# Patient Record
Sex: FEMALE | Race: WHITE | NOT HISPANIC OR LATINO | Employment: FULL TIME | URBAN - METROPOLITAN AREA
[De-identification: names, ages, dates, MRNs, and addresses within clinical notes are randomized per-mention and may not be internally consistent; named-entity substitution may affect disease eponyms.]

---

## 2017-05-02 ENCOUNTER — OFFICE VISIT (OUTPATIENT)
Dept: INTERNAL MEDICINE | Facility: CLINIC | Age: 42
End: 2017-05-02
Payer: COMMERCIAL

## 2017-05-02 VITALS
HEIGHT: 61 IN | SYSTOLIC BLOOD PRESSURE: 90 MMHG | OXYGEN SATURATION: 100 % | HEART RATE: 82 BPM | WEIGHT: 97 LBS | TEMPERATURE: 98.2 F | BODY MASS INDEX: 18.31 KG/M2 | DIASTOLIC BLOOD PRESSURE: 60 MMHG

## 2017-05-02 DIAGNOSIS — R59.1 LYMPHADENOPATHY: ICD-10-CM

## 2017-05-02 DIAGNOSIS — Z00.00 ROUTINE GENERAL MEDICAL EXAMINATION AT A HEALTH CARE FACILITY: Primary | ICD-10-CM

## 2017-05-02 LAB
ERYTHROCYTE [DISTWIDTH] IN BLOOD BY AUTOMATED COUNT: 13.7 % (ref 10–15)
HCT VFR BLD AUTO: 38.2 % (ref 35–47)
HGB BLD-MCNC: 12.3 G/DL (ref 11.7–15.7)
MCH RBC QN AUTO: 29 PG (ref 26.5–33)
MCHC RBC AUTO-ENTMCNC: 32.2 G/DL (ref 31.5–36.5)
MCV RBC AUTO: 90 FL (ref 78–100)
PLATELET # BLD AUTO: 256 10E9/L (ref 150–450)
RBC # BLD AUTO: 4.24 10E12/L (ref 3.8–5.2)
WBC # BLD AUTO: 4.4 10E9/L (ref 4–11)

## 2017-05-02 PROCEDURE — 80053 COMPREHEN METABOLIC PANEL: CPT | Performed by: INTERNAL MEDICINE

## 2017-05-02 PROCEDURE — 84443 ASSAY THYROID STIM HORMONE: CPT | Performed by: INTERNAL MEDICINE

## 2017-05-02 PROCEDURE — 85027 COMPLETE CBC AUTOMATED: CPT | Performed by: INTERNAL MEDICINE

## 2017-05-02 PROCEDURE — 80061 LIPID PANEL: CPT | Performed by: INTERNAL MEDICINE

## 2017-05-02 PROCEDURE — 99396 PREV VISIT EST AGE 40-64: CPT | Performed by: INTERNAL MEDICINE

## 2017-05-02 PROCEDURE — 36415 COLL VENOUS BLD VENIPUNCTURE: CPT | Performed by: INTERNAL MEDICINE

## 2017-05-02 ASSESSMENT — ANXIETY QUESTIONNAIRES
1. FEELING NERVOUS, ANXIOUS, OR ON EDGE: NOT AT ALL
7. FEELING AFRAID AS IF SOMETHING AWFUL MIGHT HAPPEN: SEVERAL DAYS
5. BEING SO RESTLESS THAT IT IS HARD TO SIT STILL: NOT AT ALL
3. WORRYING TOO MUCH ABOUT DIFFERENT THINGS: SEVERAL DAYS
6. BECOMING EASILY ANNOYED OR IRRITABLE: NOT AT ALL
GAD7 TOTAL SCORE: 3
2. NOT BEING ABLE TO STOP OR CONTROL WORRYING: SEVERAL DAYS
IF YOU CHECKED OFF ANY PROBLEMS ON THIS QUESTIONNAIRE, HOW DIFFICULT HAVE THESE PROBLEMS MADE IT FOR YOU TO DO YOUR WORK, TAKE CARE OF THINGS AT HOME, OR GET ALONG WITH OTHER PEOPLE: NOT DIFFICULT AT ALL

## 2017-05-02 ASSESSMENT — PATIENT HEALTH QUESTIONNAIRE - PHQ9: 5. POOR APPETITE OR OVEREATING: NOT AT ALL

## 2017-05-02 NOTE — PROGRESS NOTES
Dr Storm's note    Patient's instructions / PLAN:                                                        Plan:  1. Labs today  2. Neck CT - To schedule this test you may call Scheduling center at [phone]    3. ENT referral       ASSESSMENT & PLAN:                                                      (Z00.00) Routine general medical examination at a health care facility  (primary encounter diagnosis)  Comment:   Plan: CBC with platelets, Comprehensive metabolic         panel, Lipid panel reflex to direct LDL, TSH         with free T4 reflex            (R59.1) Lymphadenopathy  Comment:   Plan: CT Soft Tissue Neck w Contrast, OTOLARYNGOLOGY         REFERRAL, CBC with platelets, Comprehensive         metabolic panel, Lipid panel reflex to direct         LDL, TSH with free T4 reflex               Chief Complaint:                                                        Annual exam    SUBJECTIVE:                                                    History of present illness     Lump on the L side of the neck x 1 y. Neg US  Exam: sof 1 cm nodule on the L side     ROS:   General: Negative for fever, chills, major weight changes, fatigue  Skin: Negative for rashes, abnormal spots  Eyes: Negative for blurred or double vision  ENT/mouth: Negative for sinuses discomfort, earache, sore throat  Respiratory: Negative for cough, wheezes, chronic lung disease  Cardiovascular: Negative for rest or exertional chest pain, shortness of breath, palpitations, leg edema,   Gastrointestinal: Negative for vomiting, abdominal pain, heartburn, blood in stool, diarrhea, constipation  Genitourinary: Negative for urinary frequency, blood in urine, history of kidney stones  Female: Negative for abnormal vaginal bleeding, vaginal discharge  Neuro: Negative for headaches, numbness, tingling, weakness in arms or legs, history of seizure, recent syncope  Psychiatry: Negative for depression, anxiety, suicidal thoughts  Endo: Negative for known  "thyroid disease, diabetes.  Hemato/Lymph: Negative for nodes, easy bleeding, history of DVT, blood transfusion  Musculoskeletal: Negative for joint swelling, back pain      PMHx: - reviewed  Past Medical History:   Diagnosis Date     CARDIOVASCULAR SCREENING; LDL GOAL LESS THAN 160      Chronic lower back pain      Leukopenia May 2012     NO ACTIVE PROBLEMS      Vertigo 2013     Vitamin D deficiencies        PSHx: reviewed  Past Surgical History:   Procedure Laterality Date     BREAST BIOPSY, RT/LT        SECTION          Soc Hx: No daily alcohol, no smoking  Social History     Social History     Marital status:      Spouse name: N/A     Number of children: N/A     Years of education: N/A     Occupational History     Not on file.     Social History Main Topics     Smoking status: Never Smoker     Smokeless tobacco: Never Used     Alcohol use No     Drug use: No     Sexual activity: No     Other Topics Concern     Not on file     Social History Narrative        Fam Hx: reviewed  Family History   Problem Relation Age of Onset     DIABETES Father      HEART DISEASE Father          Screening: reviewed    All: reviewed    Meds: reviewed  Current Outpatient Prescriptions   Medication Sig Dispense Refill     multivitamin, therapeutic with minerals (MULTI-VITAMIN) TABS Take 1 tablet by mouth daily 100 tablet 3     Cholecalciferol (VITAMIN D) 1000 UNITS capsule Take 1 capsule by mouth daily         OBJECTIVE:                                                    Physical Exam :  Blood pressure 90/60, pulse 82, temperature 98.2  F (36.8  C), temperature source Oral, height 5' 0.5\" (1.537 m), weight 97 lb (44 kg), SpO2 100 %, not currently breastfeeding.     NAD, appears comfortable  Skin clear, no rashes  HEENT: PERRLA, EOMI, anicteric sclera, pink conjunctiva, external ears appear normal, bilateral tympanic membranes clinically normal, oropharynx normal color.  Neck: supple, no JVD,  no thyroidmegaly  Lymph " nodes non palpable in the cervical, supraclavicular axillaries, inguinal areas  Chest: clear to auscultation with good respiratory effort  Cardiac: S1S2, RRR, no mgr appreciated  Abdomen: soft, not tender, not distended, audible bowel sound, no hepatosplenomegaly, no palpable masses, no abdominal bruits  Extremities: no cyanosis, clubbing or edema.   Neuro: A, Ox3, no focal signs.  Breast exam in supine and erect position: they are symmetrical, no skin changes, no tenderness or nodes on palpation. Nipples are erect, no skin lesions, no discharge on pressure.    Pelvic exam: deferred, no symptoms, no hx of abnormal pap         Jacquie Storm MD  Internal Medicine          SUBJECTIVE:     CC: Lubna Corcoran is an 42 year old woman who presents for preventive health visit.     Healthy Habits:    Do you get at least three servings of calcium containing foods daily (dairy, green leafy vegetables, etc.)? yes    Amount of exercise or daily activities, outside of work: 7 day(s) per week    Problems taking medications regularly No    Medication side effects: No    Have you had an eye exam in the past two years? yes    Do you see a dentist twice per year? yes    Do you have sleep apnea, excessive snoring or daytime drowsiness?no        Today's PHQ-2 Score:   PHQ-2 ( 1999 [employer]) 5/2/2017 4/11/2016   Q1: Little interest or pleasure in doing things 0 0   Q2: Feeling down, depressed or hopeless 0 0   PHQ-2 Score 0 0       Abuse: Current or Past(Physical, Sexual or Emotional)- No  Do you feel safe in your environment - Yes    Social History   Substance Use Topics     Smoking status: Never Smoker     Smokeless tobacco: Never Used     Alcohol use No     The patient does not drink >3 drinks per day nor >7 drinks per week.    Recent Labs   Lab Test  04/07/15   1328  01/23/14   0922   CHOL  150  160   HDL  75  64   LDL  67  86   TRIG  40  53   CHOLHDLRATIO  2.0  2.5       Reviewed orders with patient.  Reviewed health maintenance and  "updated orders accordingly - Yes    Mammo Decision Support:    COUNSELING:   Reviewed preventive health counseling, as reflected in patient instructions       Regular exercise       Healthy diet/nutrition         reports that she has never smoked. She has never used smokeless tobacco.    Estimated body mass index is 18.48 kg/(m^2) as calculated from the following:    Height as of 12/2/16: 5' 0.5\" (1.537 m).    Weight as of 12/2/16: 96 lb 3.2 oz (43.6 kg).       Counseling Resources:  ATP IV Guidelines  Pooled Cohorts Equation Calculator  Breast Cancer Risk Calculator  FRAX Risk Assessment  ICSI Preventive Guidelines  Dietary Guidelines for Americans, 2010  USDA's MyPlate  ASA Prophylaxis  Lung CA Screening    Jacquie Georges MD  [facility]  "

## 2017-05-02 NOTE — MR AVS SNAPSHOT
After Visit Summary   5/2/2017    Lubna Corcoran    MRN: [id number]           Patient Information     Date Of Birth          1975        Visit Information        Provider Department      5/2/2017 8:20 AM Jacquie Georges MD [facility]        Today's Diagnoses     Routine general medical examination at a health care facility    -  1    Lymphadenopathy          Care Instructions    Plan:  1. Labs today  2. Neck CT - To schedule this test you may call Scheduling center at [phone]    3. ENT referral       Preventive Health Recommendations  Female Ages 40 to 49    Yearly exam:     See your health care provider every year in order to  1. Review health changes.   2. Discuss preventive care.    3. Review your medicines if your doctor prescribed any.      Get a Pap test every three years (unless you have an abnormal result and your provider advises testing more often).      If you get Pap tests with HPV test, you only need to test every 5 years, unless you have an abnormal result. You do not need a Pap test if your uterus was removed (hysterectomy) and you have not had cancer.      You should be tested each year for STDs (sexually transmitted diseases), if you're at risk.       Ask your doctor if you should have a mammogram.      Have a colonoscopy (test for colon cancer) if someone in your family has had colon cancer or polyps before age 50.       Have a cholesterol test every 5 years.       Have a diabetes test (fasting glucose) after age 45. If you are at risk for diabetes, you should have this test every 3 years.    Shots: Get a flu shot each year. Get a tetanus shot every 10 years.     Nutrition:     Eat at least 5 servings of fruits and vegetables each day.    Eat whole-grain bread, whole-wheat pasta and brown rice instead of white grains and rice.    Talk to your provider about Calcium and Vitamin D.     Lifestyle    Exercise at least 150 minutes a week (an average  "of 30 minutes a day, 5 days a week). This will help you control your weight and prevent disease.    Limit alcohol to one drink per day.    No smoking.     Wear sunscreen to prevent skin cancer.    See your dentist every six months for an exam and cleaning.        HEALTH INSURANCE AND YOUR OUT-OF-POCKET COSTS    How is the physical visit different from an office visit ?    A physical visit is a routine check-up or yearly physical exam. This is sometimes called \"preventive care\".  ( for example, you might have a clinic exam every year or a mammogram every other year). These visits are meant to prevent health problems. They do not include tests or treatments for specific medical issues.     An office visit is a clinic visit to check on a symptoms or to treat a specific concern. This concern may be new or ongoing. Your provider (care team) might order tests or prescribe treatments.     Can I have these services at the same time ?    Yes. If you come in for a physical exam, your provider will want to  talk about any symptoms you are having. This way, we may catch small problems before they become more serious - and you won't have to make another trip to the clinic.     If there is not enough time to talk about your symptoms, your provider will ask you to come back.    If you are treated for a medical issue during a physical exam, we must bill your plan for both services. This is a rule set by insurance companies.     If I receive both services, what are my out-of-pocket costs ?    Some plans will pay for both services. Others ( like Medicare) will not. You will need to pay for any service that your plan won't cover.     Even if your plan covers both services, you may still have out-of-pocket costs. Examples:    -- your plan may offer free physical exams. But you may owe a co-pay and other fees for services received as part of an office visit.   -- you plan may require two co-pays. If you have concerns about the second " "co-pay, please contact your insurance plan.    To find out what your total costs will be, you will need to call your insurance plan. Ask:    -- what does my plan cover ? Find out if your physical visit was covered. What if you also had testing or treatment for a medical concern ?    -- how much do I need to pay ? Ask about co-pays, co-insurance, your deductible and any other out-of-pocket costs.     -- are there limits on what my plan will pay for ? There may be limits on office visits, physical exams and routine tests ( such mammograms, PSA tests and colonoscopies).    About Your Out-Of-Pocket Costs    Out-of-Pocket costs are charges that are not covered by your insurance plan. You will need to pay for them yourself. They may include:    -- Services that your plan will not pay for. Please call your insurance plan to find out what it will cover.     -- A deductible. This is a fixed amount that you pay each year before insurance will pay for services. When you have paid the full amount, then you have \"met\" your deductible. After that, your plan will pay for part or all of your care.     -- Co-pays (co-payments). A co-pay is the amount you must pay at the time of service. It is a flat fee, decided by your insurance plan. Your fee may be different for wellness visits and office visits. Your plan will not cover this fee. The fee will not count toward your deductible.    -- Co-insurance. You may need to pay a percent of the costs for all services you receive. This is called co-insurance. After your clinic visit, your plan will bill you for your share of the cost. This amount may count toward your deductible.     Copyright @ [employer]. All rights reserved. [employer] 04478 - REV 11/12  -------------    Be aware that if you had a regular OBGYN appointment in the last 12 months that it might have been submitted to your insurance as the annual physical exam. Most of the insurances do not cover 2 annual exams " in a year.              Follow-ups after your visit        Additional Services     OTOLARYNGOLOGY REFERRAL       Your provider has referred you to:     N: [facility] [place] [phone]   [email or web address]    FHN: [facility] [phone]   [email or web address]  FHN: [facility] [phone]   [email or web address]  N: [facility] [place] [phone]   [email or web address]  N: [facility] [place] [phone]   [email or web address]  FHN: [facility] [phone]   [email or web address]  N: [facility] [phone]   [email or web address]    Please be aware that coverage of these services is subject to the terms and limitations of your health insurance plan.  Call member services at your health plan with any benefit or coverage questions.      Please bring the following to your appointment:  >>   Any x-rays, CTs or MRIs which have been performed.  Contact the facility where they were done to arrange for  prior to your scheduled appointment.  Any new CT, MRI or other procedures ordered by your specialist must be performed at a [facility] or coordinated by your clinic's referral office.    >>   List of current medications   >>   This referral request   >>   Any documents/labs given to you for this referral                  Future tests that were ordered for you today     Open Future Orders        Priority Expected Expires Ordered    CT Soft Tissue Neck w Contrast Routine  5/2/2018 5/2/2017            Who to contact     If you have questions or need follow up information about today's clinic visit or your schedule please contact [facility] directly  "at [phone].  Normal or non-critical lab and imaging results will be communicated to you by [employer], letter or phone within 4 business days after the clinic has received the results. If you do not hear from us within 7 days, please contact the clinic through [employer] or phone. If you have a critical or abnormal lab result, we will notify you by phone as soon as possible.  Submit refill requests through [employer] or call your pharmacy and they will forward the refill request to us. Please allow 3 business days for your refill to be completed.          Additional Information About Your Visit        [employer] Information     [employer] lets you send messages to your doctor, view your test results, renew your prescriptions, schedule appointments and more. To sign up, go to [email or web address] . Click on \"Log in\" on the left side of the screen, which will take you to the Welcome page. Then click on \"Sign up Now\" on the right side of the page.     You will be asked to enter the access code listed below, as well as some personal information. Please follow the directions to create your username and password.     Your access code is: [email or web address]  Expires: 2017  7:40 PM     Your access code will  in 90 days. If you need help or a new code, please call your [facility] clinic or [phone].        Care EveryWhere ID     This is your Care EveryWhere ID. This could be used by other organizations to access your [facility] medical records  [id number]        Your Vitals Were     Pulse Temperature Height Pulse Oximetry Breastfeeding? BMI (Body Mass Index)    82 98.2  F (36.8  C) (Oral) 5' 0.5\" (1.537 m) 100% No 18.63 kg/m2       Blood Pressure from Last 3 Encounters:   17 90/60   16 94/66   16 96/68    Weight from Last 3 Encounters:   17 97 lb (44 kg)   16 96 lb 3.2 oz (43.6 kg)   16 97 lb 11.2 oz (44.3 kg)              We Performed the Following     CBC with platelets     Comprehensive " metabolic panel     Lipid panel reflex to direct LDL     OTOLARYNGOLOGY REFERRAL     TSH with free T4 reflex        Primary Care Provider Office Phone # Fax #    Jacquie Georges [phone]       [facility] [address] [facility] [address]        Thank you!     Thank you for choosing [facility]  for your care. Our goal is always to provide you with excellent care. Hearing back from our patients is one way we can continue to improve our services. Please take a few minutes to complete the written survey that you may receive in the mail after your visit with us. Thank you!             Your Updated Medication List - Protect others around you: Learn how to safely use, store and throw away your medicines at www.disposemymeds.org.          This list is accurate as of: 5/2/17  9:07 AM.  Always use your most recent med list.                   Brand Name Dispense Instructions for use    Multi-vitamin Tabs tablet     100 tablet    Take 1 tablet by mouth daily       vitamin D 1000 UNITS capsule      Take 1 capsule by mouth daily

## 2017-05-02 NOTE — NURSING NOTE
"Chief Complaint   Patient presents with     Physical     Fasting       Initial BP 90/60 (BP Location: Right arm, Patient Position: Chair, Cuff Size: Adult Regular)  Pulse 82  Temp 98.2  F (36.8  C) (Oral)  Ht 5' 0.5\" (1.537 m)  Wt 97 lb (44 kg)  SpO2 100%  Breastfeeding? No  BMI 18.63 kg/m2 Estimated body mass index is 18.63 kg/(m^2) as calculated from the following:    Height as of this encounter: 5' 0.5\" (1.537 m).    Weight as of this encounter: 97 lb (44 kg).  Medication Reconciliation: complete   Jeanne Kaur CMA      "

## 2017-05-02 NOTE — LETTER
[facility]  [address], [address]  [place] [address]  [phone]        May 23, 2017    Lubna Corcoran  [address] [place] [address]            Dear MsShane Lubna Nilo:    The recent blood tests results are in acceptable limits.    Sincerely,    Jacquie Storm MD  Internal Medicine    These are some general explanations for tests  WBC means White Blood Cells  Platelets are small blood cells that help with forming the blood clots along with other blood factors.  Electrolytes are Sodium, Potassium, Calcium, Magnesium, Phosphorus.  Liver tests are: AST, ALT, Bilirubin, Alkaline Phosphatase.  Kidney tests are Creatinine, GFR.  HDL Cholesterol - is the good cholesterol and it is good to have it high.  LDL cholesterol is the bad cholesterol and it is good to have it low.  It is recommended to have LDL less than 130 for people with hypertension and to have it less than 100 for people with heart disease, diabetes and chronic kidney disease.  Thyroid tests are TSH, T4, T3  A1c is a test that gives us an idea about how well was controlled the diabetes for the last 3 months.

## 2017-05-02 NOTE — PATIENT INSTRUCTIONS
Plan:  1. Labs today  2. Neck CT - To schedule this test you may call Scheduling center at [phone]    3. ENT referral       Preventive Health Recommendations  Female Ages 40 to 49    Yearly exam:     See your health care provider every year in order to  1. Review health changes.   2. Discuss preventive care.    3. Review your medicines if your doctor prescribed any.      Get a Pap test every three years (unless you have an abnormal result and your provider advises testing more often).      If you get Pap tests with HPV test, you only need to test every 5 years, unless you have an abnormal result. You do not need a Pap test if your uterus was removed (hysterectomy) and you have not had cancer.      You should be tested each year for STDs (sexually transmitted diseases), if you're at risk.       Ask your doctor if you should have a mammogram.      Have a colonoscopy (test for colon cancer) if someone in your family has had colon cancer or polyps before age 50.       Have a cholesterol test every 5 years.       Have a diabetes test (fasting glucose) after age 45. If you are at risk for diabetes, you should have this test every 3 years.    Shots: Get a flu shot each year. Get a tetanus shot every 10 years.     Nutrition:     Eat at least 5 servings of fruits and vegetables each day.    Eat whole-grain bread, whole-wheat pasta and brown rice instead of white grains and rice.    Talk to your provider about Calcium and Vitamin D.     Lifestyle    Exercise at least 150 minutes a week (an average of 30 minutes a day, 5 days a week). This will help you control your weight and prevent disease.    Limit alcohol to one drink per day.    No smoking.     Wear sunscreen to prevent skin cancer.    See your dentist every six months for an exam and cleaning.        HEALTH INSURANCE AND YOUR OUT-OF-POCKET COSTS    How is the physical visit different from an office visit ?    A physical visit is a routine check-up or yearly  "physical exam. This is sometimes called \"preventive care\".  ( for example, you might have a clinic exam every year or a mammogram every other year). These visits are meant to prevent health problems. They do not include tests or treatments for specific medical issues.     An office visit is a clinic visit to check on a symptoms or to treat a specific concern. This concern may be new or ongoing. Your provider (care team) might order tests or prescribe treatments.     Can I have these services at the same time ?    Yes. If you come in for a physical exam, your provider will want to  talk about any symptoms you are having. This way, we may catch small problems before they become more serious - and you won't have to make another trip to the clinic.     If there is not enough time to talk about your symptoms, your provider will ask you to come back.    If you are treated for a medical issue during a physical exam, we must bill your plan for both services. This is a rule set by insurance companies.     If I receive both services, what are my out-of-pocket costs ?    Some plans will pay for both services. Others ( like Medicare) will not. You will need to pay for any service that your plan won't cover.     Even if your plan covers both services, you may still have out-of-pocket costs. Examples:    -- your plan may offer free physical exams. But you may owe a co-pay and other fees for services received as part of an office visit.   -- you plan may require two co-pays. If you have concerns about the second co-pay, please contact your insurance plan.    To find out what your total costs will be, you will need to call your insurance plan. Ask:    -- what does my plan cover ? Find out if your physical visit was covered. What if you also had testing or treatment for a medical concern ?    -- how much do I need to pay ? Ask about co-pays, co-insurance, your deductible and any other out-of-pocket costs.     -- are there limits on " "what my plan will pay for ? There may be limits on office visits, physical exams and routine tests ( such mammograms, PSA tests and colonoscopies).    About Your Out-Of-Pocket Costs    Out-of-Pocket costs are charges that are not covered by your insurance plan. You will need to pay for them yourself. They may include:    -- Services that your plan will not pay for. Please call your insurance plan to find out what it will cover.     -- A deductible. This is a fixed amount that you pay each year before insurance will pay for services. When you have paid the full amount, then you have \"met\" your deductible. After that, your plan will pay for part or all of your care.     -- Co-pays (co-payments). A co-pay is the amount you must pay at the time of service. It is a flat fee, decided by your insurance plan. Your fee may be different for wellness visits and office visits. Your plan will not cover this fee. The fee will not count toward your deductible.    -- Co-insurance. You may need to pay a percent of the costs for all services you receive. This is called co-insurance. After your clinic visit, your plan will bill you for your share of the cost. This amount may count toward your deductible.     Copyright @ [facility]. All rights reserved. [employer] 11899 - REV 11/12  -------------    Be aware that if you had a regular OBGYN appointment in the last 12 months that it might have been submitted to your insurance as the annual physical exam. Most of the insurances do not cover 2 annual exams in a year.        "

## 2017-05-03 LAB
ALBUMIN SERPL-MCNC: 3.8 G/DL (ref 3.4–5)
ALP SERPL-CCNC: 46 U/L (ref 40–150)
ALT SERPL W P-5'-P-CCNC: 23 U/L (ref 0–50)
ANION GAP SERPL CALCULATED.3IONS-SCNC: 10 MMOL/L (ref 3–14)
AST SERPL W P-5'-P-CCNC: 15 U/L (ref 0–45)
BILIRUB SERPL-MCNC: 0.5 MG/DL (ref 0.2–1.3)
BUN SERPL-MCNC: 11 MG/DL (ref 7–30)
CALCIUM SERPL-MCNC: 8.7 MG/DL (ref 8.5–10.1)
CHLORIDE SERPL-SCNC: 109 MMOL/L (ref 94–109)
CHOLEST SERPL-MCNC: 183 MG/DL
CO2 SERPL-SCNC: 21 MMOL/L (ref 20–32)
CREAT SERPL-MCNC: 0.57 MG/DL (ref 0.52–1.04)
GFR SERPL CREATININE-BSD FRML MDRD: >90 ML/MIN/1.7M2
GLUCOSE SERPL-MCNC: 84 MG/DL (ref 70–99)
HDLC SERPL-MCNC: 80 MG/DL
LDLC SERPL CALC-MCNC: 96 MG/DL
NONHDLC SERPL-MCNC: 103 MG/DL
POTASSIUM SERPL-SCNC: 4.4 MMOL/L (ref 3.4–5.3)
PROT SERPL-MCNC: 7.3 G/DL (ref 6.8–8.8)
SODIUM SERPL-SCNC: 140 MMOL/L (ref 133–144)
TRIGL SERPL-MCNC: 37 MG/DL
TSH SERPL DL<=0.005 MIU/L-ACNC: 2.21 MU/L (ref 0.4–4)

## 2017-05-03 ASSESSMENT — PATIENT HEALTH QUESTIONNAIRE - PHQ9: SUM OF ALL RESPONSES TO PHQ QUESTIONS 1-9: 7

## 2017-05-03 ASSESSMENT — ANXIETY QUESTIONNAIRES: GAD7 TOTAL SCORE: 3

## 2017-05-22 ENCOUNTER — TELEPHONE (OUTPATIENT)
Dept: INTERNAL MEDICINE | Facility: CLINIC | Age: 42
End: 2017-05-22

## 2017-05-22 NOTE — TELEPHONE ENCOUNTER
Reason for call: Results   Name of test or procedure: LAB  Date of test or procedure: 5/2/17  Location of test or procedure: LAB[facility] [address] [place] [address]    Additional comments: Pt wants the results of her lab work done after appt on 5/2/17    Phone Number Pt can be reached at: Other phone number:  [phone]  Best Time: 1:30pm  Can we leave a detailed message on this number? YES

## 2018-06-09 ENCOUNTER — HEALTH MAINTENANCE LETTER (OUTPATIENT)
Age: 43
End: 2018-06-09

## 2018-07-18 ENCOUNTER — TELEPHONE (OUTPATIENT)
Dept: INTERNAL MEDICINE | Facility: CLINIC | Age: 43
End: 2018-07-18

## 2018-07-19 NOTE — TELEPHONE ENCOUNTER
7/18/2018    Call Regarding Preventive Health Screening Cervical/PAP   and PHYSICAL    Attempt 1    Message on voicemail     Comments:       Outreach   SV

## 2018-07-24 NOTE — TELEPHONE ENCOUNTER
7/24/2018    Call Regarding Preventive Health Screening Cervical/PAP and Physical     Attempt 2    Message on voicemail    Comments:       Outreach   Robyn Boone

## 2018-10-05 ENCOUNTER — OFFICE VISIT (OUTPATIENT)
Dept: INTERNAL MEDICINE | Facility: CLINIC | Age: 43
End: 2018-10-05
Payer: COMMERCIAL

## 2018-10-05 VITALS
WEIGHT: 100 LBS | OXYGEN SATURATION: 98 % | HEART RATE: 75 BPM | HEIGHT: 61 IN | BODY MASS INDEX: 18.88 KG/M2 | TEMPERATURE: 98.3 F | DIASTOLIC BLOOD PRESSURE: 66 MMHG | RESPIRATION RATE: 12 BRPM | SYSTOLIC BLOOD PRESSURE: 104 MMHG

## 2018-10-05 DIAGNOSIS — Z12.4 SCREENING FOR MALIGNANT NEOPLASM OF CERVIX: ICD-10-CM

## 2018-10-05 DIAGNOSIS — M54.50 INTERMITTENT LOW BACK PAIN: ICD-10-CM

## 2018-10-05 DIAGNOSIS — Z12.31 VISIT FOR SCREENING MAMMOGRAM: ICD-10-CM

## 2018-10-05 DIAGNOSIS — Z02.9 ADMINISTRATIVE ENCOUNTER: ICD-10-CM

## 2018-10-05 DIAGNOSIS — Z00.00 ROUTINE GENERAL MEDICAL EXAMINATION AT A HEALTH CARE FACILITY: Primary | ICD-10-CM

## 2018-10-05 PROCEDURE — 99396 PREV VISIT EST AGE 40-64: CPT | Performed by: INTERNAL MEDICINE

## 2018-10-05 PROCEDURE — 87624 HPV HI-RISK TYP POOLED RSLT: CPT | Performed by: INTERNAL MEDICINE

## 2018-10-05 PROCEDURE — G0145 SCR C/V CYTO,THINLAYER,RESCR: HCPCS | Performed by: INTERNAL MEDICINE

## 2018-10-05 ASSESSMENT — ANXIETY QUESTIONNAIRES
1. FEELING NERVOUS, ANXIOUS, OR ON EDGE: NOT AT ALL
6. BECOMING EASILY ANNOYED OR IRRITABLE: NOT AT ALL
7. FEELING AFRAID AS IF SOMETHING AWFUL MIGHT HAPPEN: NEARLY EVERY DAY
5. BEING SO RESTLESS THAT IT IS HARD TO SIT STILL: NOT AT ALL
GAD7 TOTAL SCORE: 4
GAD7 TOTAL SCORE: 4
4. TROUBLE RELAXING: NOT AT ALL
7. FEELING AFRAID AS IF SOMETHING AWFUL MIGHT HAPPEN: NEARLY EVERY DAY
GAD7 TOTAL SCORE: 4
2. NOT BEING ABLE TO STOP OR CONTROL WORRYING: NOT AT ALL
3. WORRYING TOO MUCH ABOUT DIFFERENT THINGS: SEVERAL DAYS

## 2018-10-05 ASSESSMENT — PATIENT HEALTH QUESTIONNAIRE - PHQ9
SUM OF ALL RESPONSES TO PHQ QUESTIONS 1-9: 4
SUM OF ALL RESPONSES TO PHQ QUESTIONS 1-9: 4

## 2018-10-05 NOTE — PROGRESS NOTES
Dr Storm's note    Patient's instructions / PLAN:                                                        Plan:  1. Mammogram ( please call [phone] to schedule it)   2.  3.  4.  5.  6.  Continue same meds, same doses for now  Continue the other meds, same doses for now.  Please make a lab appointment for fasting labs in   Follow up a week after the labs      ASSESSMENT & PLAN:                                                      (Z00.00) Routine general medical examination at a health care facility  (primary encounter diagnosis)  Comment:   Plan:     (M54.5) Intermittent low back pain  (Z02.9) Administrative encounter  Comment:   Plan: FMLA    (Z12.4) Screening for malignant neoplasm of cervix  Comment:   Plan: Pap imaged thin layer screen with HPV -         recommended age 30 - 65 years (select HPV order        below), HPV High Risk Types DNA Cervical            (Z12.31) Visit for screening mammogram  Comment:   Plan: *MA Screening Digital Bilateral               Chief Complaint:                                                        Annual exam    SUBJECTIVE:                                                    History of present illness     Labs 2017 - normal We will not repeat that this year  She has chronic lower back pain on and off.  She wants a renewal of her FMLA form.      ROS:   General: Negative for fever, chills, major weight changes, fatigue  Skin: Negative for rashes, abnormal spots  Eyes: Negative for blurred or double vision  ENT/mouth: Negative for sinuses discomfort, earache, sore throat  Respiratory: Negative for cough, wheezes, chronic lung disease  Cardiovascular: Negative for rest or exertional chest pain, shortness of breath, palpitations, leg edema,   Gastrointestinal: Negative for vomiting, abdominal pain, heartburn, blood in stool, diarrhea, constipation  Genitourinary: Negative for urinary frequency, blood in urine, history of kidney stones  Female: Negative for abnormal vaginal  "bleeding, vaginal discharge  Neuro: Negative for headaches, numbness, tingling, weakness in arms or legs, history of seizure, recent syncope  Psychiatry: Negative for depression, anxiety, suicidal thoughts  Endo: Negative for known thyroid disease, diabetes.  Hemato/Lymph: Negative for nodes, easy bleeding, history of DVT, blood transfusion  Musculoskeletal: Negative for joint swelling, back pain      PMHx: - reviewed  Past Medical History:   Diagnosis Date     CARDIOVASCULAR SCREENING; LDL GOAL LESS THAN 160      Chronic lower back pain      Leukopenia May 2012     NO ACTIVE PROBLEMS      Vertigo 2013     Vitamin D deficiencies        PSHx: reviewed  Past Surgical History:   Procedure Laterality Date     BREAST BIOPSY, RT/LT        SECTION          Soc Hx: No daily alcohol, no smoking  Social History     Social History     Marital status:      Spouse name: N/A     Number of children: N/A     Years of education: N/A     Occupational History     Not on file.     Social History Main Topics     Smoking status: Never Smoker     Smokeless tobacco: Never Used     Alcohol use No     Drug use: No     Sexual activity: No     Other Topics Concern     Not on file     Social History Narrative        Fam Hx: reviewed  Family History   Problem Relation Age of Onset     Diabetes Father      HEART DISEASE Father      No Known Problems Mother          Screening: reviewed      All: reviewed    Meds: reviewed  Current Outpatient Prescriptions   Medication Sig Dispense Refill     Cholecalciferol (VITAMIN D) 1000 UNITS capsule Take 1 capsule by mouth daily         OBJECTIVE:                                                    Physical Exam :  Blood pressure 104/66, pulse 75, temperature 98.3  F (36.8  C), temperature source Oral, resp. rate 12, height 5' 0.5\" (1.537 m), weight 100 lb (45.4 kg), last menstrual period 2018, SpO2 98 %, not currently breastfeeding.     NAD, appears comfortable  Skin clear, no " rashes  HEENT: PERRLA, EOMI, anicteric sclera, pink conjunctiva, external ears appear normal, bilateral tympanic membranes clinically normal, oropharynx normal color.  Neck: supple, no JVD,  no thyroidmegaly  Lymph nodes non palpable in the cervical, supraclavicular axillaries, inguinal areas  Chest: clear to auscultation with good respiratory effort  Cardiac: S1S2, RRR, no mgr appreciated  Abdomen: soft, not tender, not distended, audible bowel sound, no hepatosplenomegaly, no palpable masses, no abdominal bruits  Extremities: no cyanosis, clubbing or edema.   Neuro: A, Ox3, no focal signs.  Breast exam in supine and erect position: they are symmetrical, no skin changes, no tenderness or nodes on palpation. Nipples are erect, no skin lesions, no discharge on pressure.    Pelvic exam: Normal external genitals, normal appearing perineum, normal appearing urethra,  vaginal mucosa pink, no discharge, Cervix appears normal, Pap smear obtained. On bimanual exam, I did not feel any uterus or ovarian masses, and she denies any tenderness.         Jacquie Storm MD  Internal Medicine        SUBJECTIVE:   CC: Lubna Corcoran is an 43 year old woman who presents for preventive health visit.     Physical   Annual:     Getting at least 3 servings of Calcium per day:  Yes    Bi-annual eye exam:  Yes    Dental care twice a year:  Yes    Sleep apnea or symptoms of sleep apnea:  None    Diet:  Regular (no restrictions)    Frequency of exercise:  2-3 days/week    Duration of exercise:  15-30 minutes    Taking medications regularly:  Yes    Medication side effects:  None    Additional concerns today:  No        Today's PHQ-2 Score:   PHQ-2 ( 1999 [employer]) 10/5/2018   Q1: Little interest or pleasure in doing things 0   Q2: Feeling down, depressed or hopeless 0   PHQ-2 Score 0   Q1: Little interest or pleasure in doing things Not at all   Q2: Feeling down, depressed or hopeless Not at all   PHQ-2 Score 0       Abuse: Current or  "Past(Physical, Sexual or Emotional)- No  Do you feel safe in your environment - Yes    Social History   Substance Use Topics     Smoking status: Never Smoker     Smokeless tobacco: Never Used     Alcohol use No     Alcohol Use 10/5/2018   If you drink alcohol do you typically have greater than 3 drinks per day OR greater than 7 drinks per week? Not Applicable       Reviewed orders with patient.  Reviewed health maintenance and updated orders accordingly - Yes      PAP / HPV Latest Ref Rng & Units 4/7/2015   PAP - NIL   HPV 16 DNA NEG Negative   HPV 18 DNA NEG Negative   OTHER HR HPV NEG Negative     Reviewed and updated as needed this visit by clinical staff  Tobacco  Med Hx  Surg Hx  Fam Hx  Soc Hx        Reviewed and updated as needed this visit by Provider            Review of Systems       OBJECTIVE:   There were no vitals taken for this visit.  Physical Exam        COUNSELING:  Reviewed preventive health counseling, as reflected in patient instructions       Regular exercise       Healthy diet/nutrition    BP Readings from Last 1 Encounters:   05/02/17 90/60     Estimated body mass index is 18.63 kg/(m^2) as calculated from the following:    Height as of 5/2/17: 5' 0.5\" (1.537 m).    Weight as of 5/2/17: 97 lb (44 kg).           reports that she has never smoked. She has never used smokeless tobacco.      Counseling Resources:  ATP IV Guidelines  Pooled Cohorts Equation Calculator  Breast Cancer Risk Calculator  FRAX Risk Assessment  ICSI Preventive Guidelines  Dietary Guidelines for Americans, 2010  USDA's MyPlate  ASA Prophylaxis  Lung CA Screening    Jacquie Georges MD  [facility]  "

## 2018-10-05 NOTE — MR AVS SNAPSHOT
After Visit Summary   10/5/2018    Lubna Corcoran    MRN: [id number]           Patient Information     Date Of Birth          1975        Visit Information        Provider Department      10/5/2018 9:00 AM Jacquie Georges MD [facility]        Today's Diagnoses     Routine general medical examination at a health care facility    -  1    Intermittent low back pain        Administrative encounter        Screening for malignant neoplasm of cervix        Visit for screening mammogram           Follow-ups after your visit        Who to contact     If you have questions or need follow up information about today's clinic visit or your schedule please contact [facility] directly at [phone].  Normal or non-critical lab and imaging results will be communicated to you by MyChart, letter or phone within 4 business days after the clinic has received the results. If you do not hear from us within 7 days, please contact the clinic through StudyCloudhart or phone. If you have a critical or abnormal lab result, we will notify you by phone as soon as possible.  Submit refill requests through [employer]Ink or call your pharmacy and they will forward the refill request to us. Please allow 3 business days for your refill to be completed.          Additional Information About Your Visit        MyChart Information     [employer] gives you secure access to your electronic health record. If you see a primary care provider, you can also send messages to your care team and make appointments. If you have questions, please call your primary care clinic.  If you do not have a primary care provider, please call [phone] and they will assist you.        Care EveryWhere ID     This is your Care EveryWhere ID. This could be used by other organizations to access your [facility] medical records  [id number]        Your Vitals Were     Pulse Temperature Respirations Height Last Period Pulse  "Oximetry    75 98.3  F (36.8  C) (Oral) 12 5' 0.5\" (1.537 m) 09/26/2018 (Exact Date) 98%    Breastfeeding? BMI (Body Mass Index)                No 19.21 kg/m2           Blood Pressure from Last 3 Encounters:   10/05/18 104/66   05/02/17 90/60   12/02/16 94/66    Weight from Last 3 Encounters:   10/05/18 100 lb (45.4 kg)   05/02/17 97 lb (44 kg)   12/02/16 96 lb 3.2 oz (43.6 kg)              We Performed the Following     HPV High Risk Types DNA Cervical     Pap imaged thin layer screen with HPV - recommended age 30 - 65 years (select HPV order below)        Primary Care Provider Office Phone # Fax #    Jacquie Georges [phone]       [address] [facility] [address]        Equal Access to Services     KELLIE MACHUCA : Hadii aad ku hadasho Soroxy, waaxda luqadaha, qaybta kaalmada adeegyada, tavon fontenot . So [facility] [phone].    ATENCIÓN: Si habla español, tiene a pina disposición servicios gratuitos de asistencia lingüística. Llame al [phone].    We comply with applicable federal civil rights laws and [place] laws. We do not discriminate on the basis of race, color, national origin, age, disability, sex, sexual orientation, or gender identity.            Thank you!     Thank you for choosing [facility]  for your care. Our goal is always to provide you with excellent care. Hearing back from our patients is one way we can continue to improve our services. Please take a few minutes to complete the written survey that you may receive in the mail after your visit with us. Thank you!             Your Updated Medication List - Protect others around you: Learn how to safely use, store and throw away your medicines at www.disposemymeds.org.          This list is accurate as of 10/5/18 11:59 PM.  Always use your most recent med list.                   Brand Name Dispense Instructions for use Diagnosis    vitamin D 1000 units capsule "      Take 1 capsule by mouth daily

## 2018-10-06 ASSESSMENT — PATIENT HEALTH QUESTIONNAIRE - PHQ9: SUM OF ALL RESPONSES TO PHQ QUESTIONS 1-9: 4

## 2018-10-06 ASSESSMENT — ANXIETY QUESTIONNAIRES: GAD7 TOTAL SCORE: 4

## 2018-10-09 LAB
COPATH REPORT: NORMAL
PAP: NORMAL

## 2018-10-12 LAB
FINAL DIAGNOSIS: NORMAL
HPV HR 12 DNA CVX QL NAA+PROBE: NEGATIVE
HPV16 DNA SPEC QL NAA+PROBE: NEGATIVE
HPV18 DNA SPEC QL NAA+PROBE: NEGATIVE
SPECIMEN DESCRIPTION: NORMAL
SPECIMEN SOURCE CVX/VAG CYTO: NORMAL

## 2020-02-08 ENCOUNTER — HEALTH MAINTENANCE LETTER (OUTPATIENT)
Age: 45
End: 2020-02-08

## 2020-04-13 ENCOUNTER — TELEPHONE (OUTPATIENT)
Dept: INTERNAL MEDICINE | Facility: CLINIC | Age: 45
End: 2020-04-13

## 2020-04-13 NOTE — TELEPHONE ENCOUNTER
Will route to provider for review.     Given last office visit (2018), should patient have a telephone visit?

## 2020-04-13 NOTE — TELEPHONE ENCOUNTER
Patient calling   Last ov 10/5/2018  Wants note to excuse her from work for fear of COVID 19  Patient did not work last week and stated she will not work this week  Patient stated she has no underlying health issues she is just afraid    Please advise  Ok to call and soledad [phone]

## 2020-04-13 NOTE — TELEPHONE ENCOUNTER
We cannot do a letter indicating that she needs to be off work just because she is afraid of this virus.  We could only give her excuse from work if she is medically ill.

## 2020-09-25 ENCOUNTER — MYC MEDICAL ADVICE (OUTPATIENT)
Dept: INTERNAL MEDICINE | Facility: CLINIC | Age: 45
End: 2020-09-25

## 2020-10-06 ENCOUNTER — VIRTUAL VISIT (OUTPATIENT)
Dept: INTERNAL MEDICINE | Facility: CLINIC | Age: 45
End: 2020-10-06
Payer: COMMERCIAL

## 2020-10-06 DIAGNOSIS — Z02.9 ADMINISTRATIVE ENCOUNTER: ICD-10-CM

## 2020-10-06 DIAGNOSIS — G89.29 CHRONIC MIDLINE LOW BACK PAIN WITHOUT SCIATICA: Primary | ICD-10-CM

## 2020-10-06 DIAGNOSIS — M54.50 CHRONIC MIDLINE LOW BACK PAIN WITHOUT SCIATICA: Primary | ICD-10-CM

## 2020-10-06 PROCEDURE — 99213 OFFICE O/P EST LOW 20 MIN: CPT | Mod: 95 | Performed by: INTERNAL MEDICINE

## 2020-10-06 NOTE — PROGRESS NOTES
"Lubna Corcoran is a 45 year old female who is being evaluated via a billable video visit.      The patient has been notified of following:     \"This video visit will be conducted via a call between you and your physician/provider. We have found that certain health care needs can be provided without the need for an in-person physical exam.  This service lets us provide the care you need with a video conversation.  If a prescription is necessary we can send it directly to your pharmacy.  If lab work is needed we can place an order for that and you can then stop by our lab to have the test done at a later time.    Video visits are billed at different rates depending on your insurance coverage.  Please reach out to your insurance provider with any questions.    If during the course of the call the physician/provider feels a video visit is not appropriate, you will not be charged for this service.\"    Patient has given verbal consent for Video visit? Yes  How would you like to obtain your AVS? MyChart  If you are dropped from the video visit, the video invite should be resent to: Text to cell phone: [phone]  Will anyone else be joining your video visit? No            This is a VIDEO ( using Doximity)  encounter with the patient.       Location of the provider : home   Location of the patient : home      10:38 -- 10:60         Dr Storm's note      Patient's instructions / PLAN:                                                        Plan:  1. FMLA forms  2. Continue the back exercises         ASSESSMENT & PLAN:                                                      (M54.5,  G89.29) Chronic midline low back pain without sciatica  (primary encounter diagnosis)  Z02.9) Administrative encounter  Comment:   Plan: as above        Chief complaint:                                                      FMLA regarding on/off chr LBP    SUBJECTIVE:                                                    History of present illness:  FMLA " forms for LBP  -- extensive forms with details questions and writing   -- see the forms regarding today visit       Review of Systems:                                                      ROS: negative for fever, chills, cough, wheezes, chest pain, shortness of breath, vomiting, abdominal pain, leg swelling       OBJECTIVE:           An actual physical exam can't be done during phone visit   A limited exam can sometimes be performed by video visit   NAD      PMHx: reviewed  Past Medical History:   Diagnosis Date     CARDIOVASCULAR SCREENING; LDL GOAL LESS THAN 160      Chronic lower back pain      Leukopenia May 2012     NO ACTIVE PROBLEMS      Vertigo 2013     Vitamin D deficiencies       PSHx: reviewed  Past Surgical History:   Procedure Laterality Date     BREAST BIOPSY, RT/LT        SECTION          Meds: reviewed  Current Outpatient Medications   Medication Sig Dispense Refill     Cholecalciferol (VITAMIN D) 1000 UNITS capsule Take 1 capsule by mouth daily         Soc Hx: reviewed  Fam Hx: reviewed          Jacquie Storm MD  Internal Medicine

## 2020-10-09 ENCOUNTER — TELEPHONE (OUTPATIENT)
Dept: INTERNAL MEDICINE | Facility: CLINIC | Age: 45
End: 2020-10-09

## 2020-11-07 ENCOUNTER — HEALTH MAINTENANCE LETTER (OUTPATIENT)
Age: 45
End: 2020-11-07

## 2021-03-27 ENCOUNTER — HEALTH MAINTENANCE LETTER (OUTPATIENT)
Age: 46
End: 2021-03-27

## 2021-09-05 ENCOUNTER — HEALTH MAINTENANCE LETTER (OUTPATIENT)
Age: 46
End: 2021-09-05

## 2021-12-22 ENCOUNTER — IMMUNIZATION (OUTPATIENT)
Dept: NURSING | Facility: CLINIC | Age: 46
End: 2021-12-22
Payer: COMMERCIAL

## 2021-12-22 PROCEDURE — 91300 PR COVID VAC PFIZER DIL RECON 30 MCG/0.3 ML IM: CPT

## 2021-12-22 PROCEDURE — 0004A PR COVID VAC PFIZER DIL RECON 30 MCG/0.3 ML IM: CPT

## 2022-04-17 ENCOUNTER — HEALTH MAINTENANCE LETTER (OUTPATIENT)
Age: 47
End: 2022-04-17

## 2022-06-22 NOTE — PROGRESS NOTES
Dr Storm's note    Patient's instructions / PLAN:                                                        Plan:  1. Mammogram ( please call [phone] to schedule it)   2. Pap smear -   3. colonoscopy   4. The following vaccines are recommended for you.   -- Tetanus vaccine - Td  -- today         ASSESSMENT & PLAN:                                                      (Z00.00) Routine general medical examination at a health care facility  Comment:   Plan: CBC with platelets, Lipid panel reflex to         direct LDL Fasting, Comprehensive metabolic         panel, TSH with free T4 reflex, Vitamin D         Deficiency              (Z12.31) Encounter for screening mammogram for breast cancer  (primary encounter diagnosis)  Comment:   Plan: MA Screening Digital Bilateral            (Z12.11) Special screening for malignant neoplasms, colon  Comment:   Plan: Adult GI  Referral - Procedure Only            (E55.9) Vitamin D deficiency  Comment:   Plan: Vitamin D Deficiency                   Chief Complaint:                                                        Annual exam  Follow up chronic medical problems      SUBJECTIVE:                                                    History of present illness     We reviewed the chronic medical problems as above.   I reviewed the recent tests results in Epic       ROS:     See below    PMHx: - reviewed  Past Medical History:   Diagnosis Date     CARDIOVASCULAR SCREENING; LDL GOAL LESS THAN 160      Chronic lower back pain      Leukopenia May 2012     NO ACTIVE PROBLEMS      Vertigo 2013     Vitamin D deficiencies        PSHx: reviewed  Past Surgical History:   Procedure Laterality Date     BREAST BIOPSY, RT/LT        SECTION          Soc Hx: No daily alcohol, no smoking  Social History     Socioeconomic History     Marital status:      Spouse name: Not on file     Number of children: Not on file     Years of education: Not on file     Highest  "education level: Not on file   Occupational History     Not on file   Tobacco Use     Smoking status: Never Smoker     Smokeless tobacco: Never Used   Vaping Use     Vaping Use: Never used   Substance and Sexual Activity     Alcohol use: No     Alcohol/week: 0.0 standard drinks     Drug use: No     Sexual activity: Never     Partners: Male   Other Topics Concern     Parent/sibling w/ CABG, MI or angioplasty before 65F 55M? Not Asked   Social History Narrative     Not on file     Social Determinants of Health     Financial Resource Strain: Not on file   Food Insecurity: Not on file   Transportation Needs: Not on file   Physical Activity: Not on file   Stress: Not on file   Social Connections: Not on file   Intimate Partner Violence: Not on file   Housing Stability: Not on file        Fam Hx: reviewed  Family History   Problem Relation Age of Onset     Diabetes Father      Heart Disease Father      No Known Problems Mother          Screening: reviewed      All: reviewed    Meds: reviewed  Current Outpatient Medications   Medication Sig Dispense Refill     Cholecalciferol (VITAMIN D) 1000 UNITS capsule Take 1 capsule by mouth daily         OBJECTIVE:                                                    Physical Exam :  Blood pressure (!) 86/55, pulse 71, temperature 97.3  F (36.3  C), temperature source Tympanic, resp. rate 18, height 1.537 m (5' 0.5\"), weight 45.3 kg (99 lb 12.8 oz), SpO2 100 %, not currently breastfeeding.     NAD, appears comfortable  Skin clear, no rashes  Neck: supple, no JVD,  no thyroidmegaly  Lymph nodes non palpable in the cervical, supraclavicular axillaries,   Chest: clear to auscultation with good respiratory effort  Cardiac: S1S2, RRR, no mgr appreciated  Abdomen: soft, not tender, not distended, audible bowel sound, no hepatosplenomegaly, no palpable masses, no abdominal bruits  Extremities: no cyanosis, clubbing or edema.   Neuro: A, Ox3, no focal signs.  Breast exam in supine and erect " position: they are symmetrical, no skin changes, no tenderness or nodes on palpation. Nipples are erect, no skin lesions, no discharge on pressure.    Pelvic exam: deferred, no symptoms, no hx of abnormal pap         Jacquie Storm MD  Internal Medicine          SUBJECTIVE:   CC: Lubna Corcoran is an 47 year old woman who presents for preventive health visit.       Patient has been advised of split billing requirements and indicates understanding: Yes  Healthy Habits:     Getting at least 3 servings of Calcium per day:  Yes    Bi-annual eye exam:  NO    Dental care twice a year:  Yes    Sleep apnea or symptoms of sleep apnea:  None    Diet:  Regular (no restrictions)    Frequency of exercise:  2-3 days/week    Duration of exercise:  Greater than 60 minutes    Taking medications regularly:  Yes    Medication side effects:  None    PHQ-2 Total Score: 0    Additional concerns today:  Yes              Today's PHQ-2 Score:   PHQ-2 ( 1999 Pf[employer]) 10/5/2018   Q1: Little interest or pleasure in doing things 0   Q2: Feeling down, depressed or hopeless 0   PHQ-2 Score 0   Q1: Little interest or pleasure in doing things Not at all   Q2: Feeling down, depressed or hopeless Not at all   PHQ-2 Score 0       Abuse: Current or Past (Physical, Sexual or Emotional) - No  Do you feel safe in your environment? Yes    Have you ever done Advance Care Planning? (For example, a Health Directive, POLST, or a discussion with a medical provider or your loved ones about your wishes): No, advance care planning information given to patient to review.  Patient declined advance care planning discussion at this time.    Social History     Tobacco Use     Smoking status: Never Smoker     Smokeless tobacco: Never Used   Substance Use Topics     Alcohol use: No     Alcohol/week: 0.0 standard drinks         Alcohol Use 10/5/2018   Prescreen: >3 drinks/day or >7 drinks/week? Not Applicable   Prescreen: >3 drinks/day or >7 drinks/week? -       Reviewed  "orders with patient.  Reviewed health maintenance and updated orders accordingly - Yes  Labs reviewed in EPIC    Breast Cancer Screening:  Any new diagnosis of family breast, ovarian, or bowel cancer? No    FHS-7: No flowsheet data found.      Pertinent mammograms are reviewed under the imaging tab.    History of abnormal Pap smear:   PAP / HPV Latest Ref Rng & Units 10/5/2018 4/7/2015   PAP (Historical) - NIL NIL   HPV16 NEG:Negative Negative Negative   HPV18 NEG:Negative Negative Negative   HRHPV NEG:Negative Negative Negative     Reviewed and updated as needed this visit by clinical staff                    Reviewed and updated as needed this visit by Provider                       Review of Systems   Breasts:  Positive for breast mass. Negative for tenderness and discharge.   Genitourinary: Positive for genital sores and pelvic pain. Negative for vaginal bleeding and vaginal discharge.         Patient has been advised of split billing requirements and indicates understanding: Yes At the check in, at the      COUNSELING:  Reviewed preventive health counseling, as reflected in patient instructions       Regular exercise       Healthy diet/nutrition    Estimated body mass index is 19.21 kg/m  as calculated from the following:    Height as of 10/5/18: 1.537 m (5' 0.5\").    Weight as of 10/5/18: 45.4 kg (100 lb).        She reports that she has never smoked. She has never used smokeless tobacco.      Counseling Resources:  ATP IV Guidelines  Pooled Cohorts Equation Calculator  Breast Cancer Risk Calculator  BRCA-Related Cancer Risk Assessment: FHS-7 Tool  FRAX Risk Assessment  ICSI Preventive Guidelines  Dietary Guidelines for Americans, 2010  USDA's MyPlate  ASA Prophylaxis  Lung CA Screening    Jacquie Georges MD  [facility]  "

## 2022-06-23 ENCOUNTER — OFFICE VISIT (OUTPATIENT)
Dept: INTERNAL MEDICINE | Facility: CLINIC | Age: 47
End: 2022-06-23
Payer: COMMERCIAL

## 2022-06-23 VITALS
HEIGHT: 61 IN | HEART RATE: 71 BPM | BODY MASS INDEX: 18.84 KG/M2 | OXYGEN SATURATION: 100 % | RESPIRATION RATE: 18 BRPM | DIASTOLIC BLOOD PRESSURE: 55 MMHG | WEIGHT: 99.8 LBS | TEMPERATURE: 97.3 F | SYSTOLIC BLOOD PRESSURE: 86 MMHG

## 2022-06-23 DIAGNOSIS — E55.9 VITAMIN D DEFICIENCY: ICD-10-CM

## 2022-06-23 DIAGNOSIS — Z00.00 ROUTINE GENERAL MEDICAL EXAMINATION AT A HEALTH CARE FACILITY: Primary | ICD-10-CM

## 2022-06-23 DIAGNOSIS — Z23 NEED FOR VACCINATION: ICD-10-CM

## 2022-06-23 DIAGNOSIS — Z12.11 SPECIAL SCREENING FOR MALIGNANT NEOPLASMS, COLON: ICD-10-CM

## 2022-06-23 DIAGNOSIS — Z12.31 ENCOUNTER FOR SCREENING MAMMOGRAM FOR BREAST CANCER: ICD-10-CM

## 2022-06-23 LAB
ERYTHROCYTE [DISTWIDTH] IN BLOOD BY AUTOMATED COUNT: 14.5 % (ref 10–15)
HCT VFR BLD AUTO: 34.3 % (ref 35–47)
HGB BLD-MCNC: 11 G/DL (ref 11.7–15.7)
MCH RBC QN AUTO: 26.3 PG (ref 26.5–33)
MCHC RBC AUTO-ENTMCNC: 32.1 G/DL (ref 31.5–36.5)
MCV RBC AUTO: 82 FL (ref 78–100)
PLATELET # BLD AUTO: 226 10E3/UL (ref 150–450)
RBC # BLD AUTO: 4.19 10E6/UL (ref 3.8–5.2)
WBC # BLD AUTO: 2.9 10E3/UL (ref 4–11)

## 2022-06-23 PROCEDURE — 80061 LIPID PANEL: CPT | Performed by: INTERNAL MEDICINE

## 2022-06-23 PROCEDURE — 85027 COMPLETE CBC AUTOMATED: CPT | Performed by: INTERNAL MEDICINE

## 2022-06-23 PROCEDURE — 80053 COMPREHEN METABOLIC PANEL: CPT | Performed by: INTERNAL MEDICINE

## 2022-06-23 PROCEDURE — 90714 TD VACC NO PRESV 7 YRS+ IM: CPT | Performed by: INTERNAL MEDICINE

## 2022-06-23 PROCEDURE — 90471 IMMUNIZATION ADMIN: CPT | Performed by: INTERNAL MEDICINE

## 2022-06-23 PROCEDURE — 99396 PREV VISIT EST AGE 40-64: CPT | Mod: 25 | Performed by: INTERNAL MEDICINE

## 2022-06-23 PROCEDURE — 82306 VITAMIN D 25 HYDROXY: CPT | Performed by: INTERNAL MEDICINE

## 2022-06-23 PROCEDURE — 36415 COLL VENOUS BLD VENIPUNCTURE: CPT | Performed by: INTERNAL MEDICINE

## 2022-06-23 PROCEDURE — 84443 ASSAY THYROID STIM HORMONE: CPT | Performed by: INTERNAL MEDICINE

## 2022-06-23 ASSESSMENT — ENCOUNTER SYMPTOMS: BREAST MASS: 1

## 2022-06-23 NOTE — PATIENT INSTRUCTIONS
Plan:  1. Mammogram ( please call [phone] to schedule it)   2. Pap smear - 2023  3. colonoscopy   4. The following vaccines are recommended for you.   -- Tetanus vaccine - Td  -- today

## 2022-06-24 LAB — DEPRECATED CALCIDIOL+CALCIFEROL SERPL-MC: 21 UG/L (ref 20–75)

## 2022-06-26 LAB
ALBUMIN SERPL-MCNC: 3.5 G/DL (ref 3.4–5)
ALP SERPL-CCNC: 52 U/L (ref 40–150)
ALT SERPL W P-5'-P-CCNC: 18 U/L (ref 0–50)
ANION GAP SERPL CALCULATED.3IONS-SCNC: 6 MMOL/L (ref 3–14)
AST SERPL W P-5'-P-CCNC: 15 U/L (ref 0–45)
BILIRUB SERPL-MCNC: 0.4 MG/DL (ref 0.2–1.3)
BUN SERPL-MCNC: 7 MG/DL (ref 7–30)
CALCIUM SERPL-MCNC: 8.2 MG/DL (ref 8.5–10.1)
CHLORIDE BLD-SCNC: 109 MMOL/L (ref 94–109)
CHOLEST SERPL-MCNC: 177 MG/DL
CO2 SERPL-SCNC: 23 MMOL/L (ref 20–32)
CREAT SERPL-MCNC: 0.57 MG/DL (ref 0.52–1.04)
FASTING STATUS PATIENT QL REPORTED: YES
GFR SERPL CREATININE-BSD FRML MDRD: >90 ML/MIN/1.73M2
GLUCOSE BLD-MCNC: 88 MG/DL (ref 70–99)
HDLC SERPL-MCNC: 86 MG/DL
LDLC SERPL CALC-MCNC: 83 MG/DL
NONHDLC SERPL-MCNC: 91 MG/DL
POTASSIUM BLD-SCNC: 4.5 MMOL/L (ref 3.4–5.3)
PROT SERPL-MCNC: 6.6 G/DL (ref 6.8–8.8)
SODIUM SERPL-SCNC: 138 MMOL/L (ref 133–144)
TRIGL SERPL-MCNC: 40 MG/DL
TSH SERPL DL<=0.005 MIU/L-ACNC: 3.98 MU/L (ref 0.4–4)

## 2022-06-28 ENCOUNTER — MYC MEDICAL ADVICE (OUTPATIENT)
Dept: INTERNAL MEDICINE | Facility: CLINIC | Age: 47
End: 2022-06-28

## 2022-06-28 DIAGNOSIS — D64.9 ANEMIA, UNSPECIFIED TYPE: Primary | ICD-10-CM

## 2022-06-28 DIAGNOSIS — D72.819 LEUKOPENIA, UNSPECIFIED TYPE: ICD-10-CM

## 2022-06-28 DIAGNOSIS — E83.51 HYPOCALCEMIA: ICD-10-CM

## 2022-09-28 ENCOUNTER — HOSPITAL ENCOUNTER (OUTPATIENT)
Dept: MAMMOGRAPHY | Facility: CLINIC | Age: 47
Discharge: HOME | End: 2022-09-28
Attending: INTERNAL MEDICINE | Admitting: INTERNAL MEDICINE
Payer: COMMERCIAL

## 2022-09-28 DIAGNOSIS — Z12.31 ENCOUNTER FOR SCREENING MAMMOGRAM FOR BREAST CANCER: ICD-10-CM

## 2022-09-28 PROCEDURE — 77067 SCR MAMMO BI INCL CAD: CPT

## 2022-10-05 ENCOUNTER — TELEPHONE (OUTPATIENT)
Dept: INTERNAL MEDICINE | Facility: CLINIC | Age: 47
End: 2022-10-05

## 2022-10-05 NOTE — TELEPHONE ENCOUNTER
I am not sure what the specific orders are they are looking for? Could you please clarify?     Thanks.

## 2022-10-05 NOTE — TELEPHONE ENCOUNTER
[facility] calls to request additional orders be signed.  Routed to Nancy Wallis on Dr. Storm's behalf.

## 2022-10-07 ENCOUNTER — HOSPITAL ENCOUNTER (OUTPATIENT)
Dept: MAMMOGRAPHY | Facility: CLINIC | Age: 47
Discharge: HOME | End: 2022-10-07
Attending: INTERNAL MEDICINE
Payer: COMMERCIAL

## 2022-10-07 DIAGNOSIS — R92.8 ABNORMAL MAMMOGRAM: ICD-10-CM

## 2022-10-07 PROCEDURE — 77065 DX MAMMO INCL CAD UNI: CPT | Mod: LT

## 2022-10-23 ENCOUNTER — HEALTH MAINTENANCE LETTER (OUTPATIENT)
Age: 47
End: 2022-10-23

## 2022-10-28 ENCOUNTER — LAB (OUTPATIENT)
Dept: LAB | Facility: CLINIC | Age: 47
End: 2022-10-28
Payer: COMMERCIAL

## 2022-10-28 DIAGNOSIS — E83.51 HYPOCALCEMIA: ICD-10-CM

## 2022-10-28 DIAGNOSIS — D64.9 ANEMIA, UNSPECIFIED TYPE: ICD-10-CM

## 2022-10-28 DIAGNOSIS — D72.819 LEUKOPENIA, UNSPECIFIED TYPE: ICD-10-CM

## 2022-10-28 LAB
BASOPHILS # BLD AUTO: 0 10E3/UL (ref 0–0.2)
BASOPHILS NFR BLD AUTO: 1 %
EOSINOPHIL # BLD AUTO: 0.1 10E3/UL (ref 0–0.7)
EOSINOPHIL NFR BLD AUTO: 2 %
ERYTHROCYTE [DISTWIDTH] IN BLOOD BY AUTOMATED COUNT: 16.5 % (ref 10–15)
FERRITIN SERPL-MCNC: 12 NG/ML (ref 8–252)
FOLATE SERPL-MCNC: 19.5 NG/ML (ref 4.6–34.8)
HCT VFR BLD AUTO: 39.6 % (ref 35–47)
HGB BLD-MCNC: 12.3 G/DL (ref 11.7–15.7)
IMM GRANULOCYTES # BLD: 0 10E3/UL
IMM GRANULOCYTES NFR BLD: 0 %
IRON SATN MFR SERPL: 24 % (ref 15–46)
IRON SERPL-MCNC: 74 UG/DL (ref 35–180)
LYMPHOCYTES # BLD AUTO: 1.7 10E3/UL (ref 0.8–5.3)
LYMPHOCYTES NFR BLD AUTO: 53 %
MCH RBC QN AUTO: 27.7 PG (ref 26.5–33)
MCHC RBC AUTO-ENTMCNC: 31.1 G/DL (ref 31.5–36.5)
MCV RBC AUTO: 89 FL (ref 78–100)
MONOCYTES # BLD AUTO: 0.4 10E3/UL (ref 0–1.3)
MONOCYTES NFR BLD AUTO: 12 %
NEUTROPHILS # BLD AUTO: 1 10E3/UL (ref 1.6–8.3)
NEUTROPHILS NFR BLD AUTO: 32 %
NRBC # BLD AUTO: 0 10E3/UL
NRBC BLD AUTO-RTO: 0 /100
PLATELET # BLD AUTO: 161 10E3/UL (ref 150–450)
RBC # BLD AUTO: 4.44 10E6/UL (ref 3.8–5.2)
TIBC SERPL-MCNC: 314 UG/DL (ref 240–430)
VIT B12 SERPL-MCNC: 1125 PG/ML (ref 232–1245)
WBC # BLD AUTO: 3.3 10E3/UL (ref 4–11)

## 2022-10-28 PROCEDURE — 82607 VITAMIN B-12: CPT

## 2022-10-28 PROCEDURE — 85025 COMPLETE CBC W/AUTO DIFF WBC: CPT

## 2022-10-28 PROCEDURE — 83540 ASSAY OF IRON: CPT

## 2022-10-28 PROCEDURE — 82746 ASSAY OF FOLIC ACID SERUM: CPT

## 2022-10-28 PROCEDURE — 82728 ASSAY OF FERRITIN: CPT

## 2022-10-28 PROCEDURE — 36415 COLL VENOUS BLD VENIPUNCTURE: CPT

## 2022-10-28 PROCEDURE — 83550 IRON BINDING TEST: CPT

## 2022-10-28 PROCEDURE — 86038 ANTINUCLEAR ANTIBODIES: CPT

## 2022-10-28 PROCEDURE — 82306 VITAMIN D 25 HYDROXY: CPT

## 2022-10-31 LAB
ANA SER QL IF: NEGATIVE
DEPRECATED CALCIDIOL+CALCIFEROL SERPL-MC: 23 UG/L (ref 20–75)

## 2022-11-01 ENCOUNTER — OFFICE VISIT (OUTPATIENT)
Dept: INTERNAL MEDICINE | Facility: CLINIC | Age: 47
End: 2022-11-01
Payer: COMMERCIAL

## 2022-11-01 ENCOUNTER — TELEPHONE (OUTPATIENT)
Dept: INTERNAL MEDICINE | Facility: CLINIC | Age: 47
End: 2022-11-01

## 2022-11-01 VITALS
HEIGHT: 61 IN | WEIGHT: 96 LBS | HEART RATE: 70 BPM | DIASTOLIC BLOOD PRESSURE: 55 MMHG | RESPIRATION RATE: 18 BRPM | BODY MASS INDEX: 18.12 KG/M2 | TEMPERATURE: 97.6 F | SYSTOLIC BLOOD PRESSURE: 89 MMHG | OXYGEN SATURATION: 100 %

## 2022-11-01 DIAGNOSIS — B07.0 PLANTAR WARTS: ICD-10-CM

## 2022-11-01 DIAGNOSIS — E55.9 VITAMIN D DEFICIENCY: ICD-10-CM

## 2022-11-01 DIAGNOSIS — D72.819 LEUKOPENIA, UNSPECIFIED TYPE: Primary | ICD-10-CM

## 2022-11-01 DIAGNOSIS — E61.1 IRON DEFICIENCY: ICD-10-CM

## 2022-11-01 PROCEDURE — 99214 OFFICE O/P EST MOD 30 MIN: CPT | Performed by: INTERNAL MEDICINE

## 2022-11-01 RX ORDER — LANOLIN ALCOHOL/MO/W.PET/CERES
1000 CREAM (GRAM) TOPICAL DAILY
COMMUNITY

## 2022-11-01 ASSESSMENT — PAIN SCALES - GENERAL: PAINLEVEL: NO PAIN (0)

## 2022-11-01 NOTE — PATIENT INSTRUCTIONS
Plan:  1. Increase the vitamin D from 5000 units once a week to twice a week  2. Continue Iron supplements for at least 6 months   3. Schedule your colonoscopy   4. Podiatry referral  5. appointment for [place]

## 2022-11-01 NOTE — TELEPHONE ENCOUNTER
Patient calling with a message for primary care provider.  Patient had appointment today.      Regarding B12 - patient wondering if she should be continuing her supplements?  Please advise, thanks.

## 2022-11-01 NOTE — TELEPHONE ENCOUNTER
Called patient and left message to call the clinic back or check myc message    Myc message sent to patient

## 2022-11-01 NOTE — PROGRESS NOTES
Dr Storm's note      Patient's instructions / PLAN:                                                        Plan:  1. Increase the vitamin D from 5000 units once a week to twice a week  2. Continue Iron supplements for at least 6 months   3. Schedule your colonoscopy   4. Podiatry referral  5. appointment for FMLA         ASSESSMENT & PLAN:                                                      (D72.462) Leukopenia, unspecified type  (primary encounter diagnosis)  Comment:   Plan: Observation, annual labs    (E61.1) Iron deficiency  Comment: Getting better  Plan: As above    (E55.9) Vitamin D deficiency  Comment: Persist  Plan: As above    (B07.0) Plantar warts  Comment: With pain  Plan: Orthopedic  Referral               Chief complaint:                                                      Labs results     SUBJECTIVE:                                                    History of present illness:    Lubna is here today to discuss the recent blood test results.  White blood cells or 2.9 at the last office visit.  The most recent number is 3.3 with mild increased neutrophils on the differential.  Reviewing the chart she always had white blood cells in the range of 3.3-4.  She does not have history of frequent infections.  ELDON was negative.  At this point we can just monitor the white blood cells yearly.  I offered her hematology referral if she is interested in further work-up, but since she feels well she will think about it for now.    She has iron deficiency anemia, hemoglobin has been going up since she takes iron.  Ferritin is still on the low side and I advised her to continue with the supplements.  I also advised her to schedule her colonoscopy.    Vitamin D level is on the low side.  She takes 5000 units once a week.  I advised her to increase it to twice a week as above.    She has warts and pain when she walks.  On the physical exam she has 2 big wart/calluses over the metatarsal area.  She gave me  "verbal consent and with the scalpel I shaved them just a little bit to provide comfort until she sees the podiatry.  No complications.  I also advised her to have soft inserts in her shoes until she sees the podiatry        History of Present Illness       Reason for visit:  Lab results follow up    She eats 2-3 servings of fruits and vegetables daily.She consumes 0 sweetened beverage(s) daily.She exercises with enough effort to increase her heart rate 30 to 60 minutes per day.  She exercises with enough effort to increase her heart rate 5 days per week. She is missing 1 dose(s) of medications per week.  She is not taking prescribed medications regularly due to remembering to take.       Review of Systems:                                                      ROS: negative for fever, chills, cough, wheezes, chest pain, shortness of breath, vomiting, abdominal pain, leg swelling       OBJECTIVE:             Physical exam:  Blood pressure (!) 89/55, pulse 70, temperature 97.6  F (36.4  C), temperature source Tympanic, resp. rate 18, height 1.537 m (5' 0.5\"), weight 43.5 kg (96 lb), SpO2 100 %, not currently breastfeeding.     Extremities: no edema, words/calluses about 1 cm or more on both metatarsal area  Neurologic: A, Ox3, no focal signs appreciated    PMHx: reviewed  Past Medical History:   Diagnosis Date     CARDIOVASCULAR SCREENING; LDL GOAL LESS THAN 160      Chronic lower back pain      Leukopenia May 2012     NO ACTIVE PROBLEMS      Vertigo 2013     Vitamin D deficiencies       PSHx: reviewed  Past Surgical History:   Procedure Laterality Date     BREAST BIOPSY, RT/LT        SECTION          Meds: reviewed  Current Outpatient Medications   Medication Sig Dispense Refill     Cholecalciferol (VITAMIN D) 1000 UNITS capsule Take 1 capsule by mouth daily       cyanocobalamin (VITAMIN B-12) 1000 MCG tablet Take 1,000 mcg by mouth daily       ferrous sulfate 220 (44 Fe) MG/5ML ELIX Take 220 mg by mouth " daily         Soc Hx: reviewed  Fam Hx: reviewed      Chart documentation was completed, in part, with [employer] voice-recognition software. Even though reviewed, some grammatical, spelling, and word errors may remain.      Jacquie Storm MD  Internal Medicine

## 2022-12-27 NOTE — TELEPHONE ENCOUNTER
Dentist requesting letter regarding dental work that needs to be done  [facility]  Attn Loida  Fax [phone]    lmtco on pts vm to confirm before faxing letter         Patient has seen myc message. Will close encounter

## 2023-01-19 ENCOUNTER — TELEPHONE (OUTPATIENT)
Dept: INTERNAL MEDICINE | Facility: CLINIC | Age: 48
End: 2023-01-19

## 2023-01-19 ENCOUNTER — VIRTUAL VISIT (OUTPATIENT)
Dept: INTERNAL MEDICINE | Facility: CLINIC | Age: 48
End: 2023-01-19
Payer: COMMERCIAL

## 2023-01-19 DIAGNOSIS — M54.50 LUMBAR PAIN: Primary | ICD-10-CM

## 2023-01-19 PROCEDURE — 99213 OFFICE O/P EST LOW 20 MIN: CPT | Mod: 95 | Performed by: INTERNAL MEDICINE

## 2023-01-19 NOTE — PROGRESS NOTES
Lubna is a 47 year old who is being evaluated via a billable video visit.      How would you like to obtain your AVS? Mail a copy  If the video visit is dropped, the invitation should be resent by: Text to cell phone: [phone]  Will anyone else be joining your video visit? No          This is a VIDEO ( using [employer])  encounter with the patient.       Location of the provider : office   Location of the patient : home      07:50 - 07:52      Lubna is writing and she thought we can do the video appointment while she is driving.  She needs full attention while she is driving and she needs full attention for this visit.    She will reschedule the appointment.      I had time to call her later    10:44 -- 11:01             Dr Storm's note      Patient's instructions / PLAN:                                                        Plan:  1. FMLA forms -- will be faxed  2. Schedule ANNUAL EXAM  After June 23, 2023        ASSESSMENT & PLAN:                                                      (M54.50) Lumbar pain  (primary encounter diagnosis)  Comment:   Plan: FMLA        Chief complaint:                                                      FMLA for chr back pain     SUBJECTIVE:                                                    History of present illness:    Chr LBP  -- on/off  -- when she has the pain she can't go to work. At her job she has to lift mail   -- LBP -- not progressing       Review of Systems:                                                      ROS: negative for fever, chills, cough, wheezes, chest pain, shortness of breath, vomiting, abdominal pain, leg swelling *      OBJECTIVE:           An actual physical exam can't be done during phone visit   A limited exam can sometimes be performed by video visit   NAD      PMHx: reviewed  Past Medical History:   Diagnosis Date     CARDIOVASCULAR SCREENING; LDL GOAL LESS THAN 160      Chronic lower back pain      Leukopenia May 2012     NO ACTIVE PROBLEMS       Vertigo 2013     Vitamin D deficiencies       PSHx: reviewed  Past Surgical History:   Procedure Laterality Date     BREAST BIOPSY, RT/LT        SECTION          Meds: reviewed  Current Outpatient Medications   Medication Sig Dispense Refill     Cholecalciferol (VITAMIN D) 1000 UNITS capsule Take 1 capsule by mouth daily       cyanocobalamin (VITAMIN B-12) 1000 MCG tablet Take 1,000 mcg by mouth daily       ferrous sulfate 220 (44 Fe) MG/5ML ELIX Take 220 mg by mouth daily         Soc Hx: reviewed  Fam Hx: reviewed        Chart documentation was completed, in part, with [employer] voice-recognition software. Even though reviewed, some grammatical, spelling, and word errors may remain.    Jacquie Storm MD  Internal Medicine

## 2023-01-19 NOTE — TELEPHONE ENCOUNTER
Faxed [facility] paperwork  [employer]. [phone]

## 2023-02-08 NOTE — TELEPHONE ENCOUNTER
Patient calls and states employer did not receive all pages of her [facility] paperwork.  She doesn't know which one is missing but states an entire page was missed.  Tomorrow is the deadline, please find and refax.  BHARTI Gonsalves R.N.

## 2023-05-18 ENCOUNTER — TELEPHONE (OUTPATIENT)
Dept: INTERNAL MEDICINE | Facility: CLINIC | Age: 48
End: 2023-05-18
Payer: COMMERCIAL

## 2023-05-18 DIAGNOSIS — N63.10 MASS OF RIGHT BREAST, UNSPECIFIED QUADRANT: Primary | ICD-10-CM

## 2023-05-18 NOTE — TELEPHONE ENCOUNTER
Patient calls asking for referral to [facility]. She c/o lump in R breast. She stated Dr Storm is aware of this breast issue     Fax: [phone]  Ph: [phone]

## 2023-05-22 NOTE — TELEPHONE ENCOUNTER
Patient has an open access insurance plan. If [facility] is in her network she can go there. Patient needs to check with her insurance.    Mone PORRAS-referral coordinator

## 2023-05-24 NOTE — TELEPHONE ENCOUNTER
Called to let her know about her insurance plan. Patient said that she needs a referral and that is the only was she is able to be seen.

## 2023-06-20 ASSESSMENT — ENCOUNTER SYMPTOMS
NAUSEA: 0
ARTHRALGIAS: 0
ABDOMINAL PAIN: 0
SHORTNESS OF BREATH: 0
HEMATOCHEZIA: 0
JOINT SWELLING: 0
FREQUENCY: 0
DIARRHEA: 0
COUGH: 0
HEMATURIA: 0
HEADACHES: 0
FEVER: 0
CHILLS: 0
MYALGIAS: 0
DIZZINESS: 0
BREAST MASS: 1
NERVOUS/ANXIOUS: 0
CONSTIPATION: 0
PALPITATIONS: 0
WEAKNESS: 1
SORE THROAT: 0
PARESTHESIAS: 0
DYSURIA: 0
HEARTBURN: 0
EYE PAIN: 0

## 2023-06-27 ENCOUNTER — LAB (OUTPATIENT)
Dept: INTERNAL MEDICINE | Facility: CLINIC | Age: 48
End: 2023-06-27

## 2023-06-27 ENCOUNTER — OFFICE VISIT (OUTPATIENT)
Dept: INTERNAL MEDICINE | Facility: CLINIC | Age: 48
End: 2023-06-27
Payer: COMMERCIAL

## 2023-06-27 VITALS
SYSTOLIC BLOOD PRESSURE: 90 MMHG | HEART RATE: 85 BPM | OXYGEN SATURATION: 99 % | WEIGHT: 92.6 LBS | DIASTOLIC BLOOD PRESSURE: 64 MMHG | TEMPERATURE: 98 F | BODY MASS INDEX: 17.48 KG/M2 | RESPIRATION RATE: 18 BRPM | HEIGHT: 61 IN

## 2023-06-27 DIAGNOSIS — Z00.00 ROUTINE GENERAL MEDICAL EXAMINATION AT A HEALTH CARE FACILITY: Primary | ICD-10-CM

## 2023-06-27 DIAGNOSIS — R59.0 CERVICAL ADENOPATHY: ICD-10-CM

## 2023-06-27 DIAGNOSIS — Z12.11 SCREEN FOR COLON CANCER: ICD-10-CM

## 2023-06-27 DIAGNOSIS — Z12.4 CERVICAL CANCER SCREENING: ICD-10-CM

## 2023-06-27 DIAGNOSIS — F51.02 ADJUSTMENT INSOMNIA: ICD-10-CM

## 2023-06-27 DIAGNOSIS — N63.41 SUBAREOLAR MASS OF RIGHT BREAST: ICD-10-CM

## 2023-06-27 PROCEDURE — 99396 PREV VISIT EST AGE 40-64: CPT | Performed by: INTERNAL MEDICINE

## 2023-06-27 PROCEDURE — G0145 SCR C/V CYTO,THINLAYER,RESCR: HCPCS | Performed by: INTERNAL MEDICINE

## 2023-06-27 PROCEDURE — 87624 HPV HI-RISK TYP POOLED RSLT: CPT | Performed by: INTERNAL MEDICINE

## 2023-06-27 PROCEDURE — 99214 OFFICE O/P EST MOD 30 MIN: CPT | Mod: 25 | Performed by: INTERNAL MEDICINE

## 2023-06-27 RX ORDER — TRAZODONE HYDROCHLORIDE 50 MG/1
25-100 TABLET, FILM COATED ORAL AT BEDTIME
Qty: 30 TABLET | Refills: 3 | Status: SHIPPED | OUTPATIENT
Start: 2023-06-27 | End: 2024-09-04

## 2023-06-27 ASSESSMENT — ENCOUNTER SYMPTOMS
FREQUENCY: 0
EYE PAIN: 0
SORE THROAT: 0
DIARRHEA: 0
NAUSEA: 0
HEARTBURN: 0
HEMATURIA: 0
HEADACHES: 0
MYALGIAS: 0
SHORTNESS OF BREATH: 0
WEAKNESS: 1
HEMATOCHEZIA: 0
PALPITATIONS: 0
PARESTHESIAS: 0
JOINT SWELLING: 0
NERVOUS/ANXIOUS: 0
DIZZINESS: 0
CONSTIPATION: 0
COUGH: 0
FEVER: 0
CHILLS: 0
DYSURIA: 0
ARTHRALGIAS: 0
ABDOMINAL PAIN: 0
BREAST MASS: 1

## 2023-06-27 ASSESSMENT — PAIN SCALES - GENERAL: PAINLEVEL: NO PAIN (0)

## 2023-06-27 NOTE — PATIENT INSTRUCTIONS
Plan:  1. Schedule video appointment June 29 ( 7:10) to discuss FMLA  2. Mammogram ( please call [phone] to schedule it)

## 2023-06-27 NOTE — PROGRESS NOTES
Dr Storm's note    Patient's instructions / PLAN:                                                        Plan:  1. Schedule video appointment June 29 ( 7:10) to discuss FMLA  2. Mammogram ( please call [phone] to schedule it)   3. Neck CT      ASSESSMENT & PLAN:                                                        (Z00.00) Routine general medical examination at a health care facility  (primary encounter diagnosis)  Comment:   Plan:     (F51.02) Adjustment insomnia  Comment: We discussed about the new meds, advantages and potential side effects. The patient will read also the info from the pharmacy and call back if questions.   Plan: traZODone (DESYREL) 50 MG tablet            (N63.41) Subareolar mass of right breast  Comment: Increased significantly in less than a year  Plan: MA Diagnostic Digital Bilateral, US Breast         Right Limited 1-3 Quadrants            (R59.0) Cervical adenopathy  Comment: new  Plan: CT Soft Tissue Neck w Contrast            (Z12.4) Cervical cancer screening  Comment:   Plan: Pap Screen with HPV - recommended age 30 - 65         years            (Z12.11) Screen for colon cancer  Comment:   Plan: COLOGUARD([employer])               Chief Complaint:                                                        Annual exam  Follow up chronic medical problems      SUBJECTIVE:                                                    History of present illness     We reviewed the chronic medical problems as above.   I reviewed the recent tests results in Epic     We reviewed the last labs. No need to repeat and she prefers not to do labs today  Prefers cologuard and not colonoscopy    Lost  March 2023 - sudden death    R breast mass 4 cm hard bigger than 6 m ago , last mamm: benign    L ant neck 1 cm mass     ROS:     See below       PMHx: - reviewed  Past Medical History:   Diagnosis Date     CARDIOVASCULAR SCREENING; LDL GOAL LESS THAN 160      Chronic lower back pain       Leukopenia May 2012     NO ACTIVE PROBLEMS      Vertigo 2013     Vitamin D deficiencies        PSHx: reviewed  Past Surgical History:   Procedure Laterality Date     BREAST BIOPSY, RT/LT        SECTION          Soc Hx: No daily alcohol, no smoking  Social History     Socioeconomic History     Marital status:      Spouse name: Not on file     Number of children: Not on file     Years of education: Not on file     Highest education level: Not on file   Occupational History     Not on file   Tobacco Use     Smoking status: Never     Passive exposure: Never     Smokeless tobacco: Never   Vaping Use     Vaping Use: Never used   Substance and Sexual Activity     Alcohol use: No     Alcohol/week: 0.0 standard drinks of alcohol     Drug use: No     Sexual activity: Never     Partners: Male   Other Topics Concern     Parent/sibling w/ CABG, MI or angioplasty before 65F 55M? Not Asked   Social History Narrative     Not on file     Social Determinants of Health     Financial Resource Strain: Not on file   Food Insecurity: Not on file   Transportation Needs: Not on file   Physical Activity: Not on file   Stress: Not on file   Social Connections: Not on file   Intimate Partner Violence: Not on file   Housing Stability: Not on file        Fam Hx: reviewed  Family History   Problem Relation Age of Onset     No Known Problems Mother      Diabetes Father      Heart Disease Father      Breast Cancer No family hx of      Ovarian Cancer No family hx of          Screening: reviewed      All: reviewed    Meds: reviewed  Current Outpatient Medications   Medication Sig Dispense Refill     Cholecalciferol (VITAMIN D) 1000 UNITS capsule Take 1 capsule by mouth daily       cyanocobalamin (VITAMIN B-12) 1000 MCG tablet Take 1,000 mcg by mouth daily       ferrous sulfate 220 (44 Fe) MG/5ML ELIX Take 220 mg by mouth daily       traZODone (DESYREL) 50 MG tablet Take 0.5-2 tablets ( mg) by mouth At Bedtime 30 tablet 3  "      OBJECTIVE:                                                    Physical Exam :  Blood pressure 90/64, pulse 85, temperature 98  F (36.7  C), temperature source Tympanic, resp. rate 18, height 1.537 m (5' 0.5\"), weight 42 kg (92 lb 9.6 oz), SpO2 99 %, not currently breastfeeding.     NAD, appears comfortable  Skin clear, no rashes  Neck: supple, no JVD,  no thyroidmegaly.  On the left anterior neck there is 1 cm nodule, not tender.  I do not feel any thyroid nodes  Lymph nodes non palpable in the  supraclavicular axillaries,   Chest: clear to auscultation with good respiratory effort  Cardiac: S1S2, RRR, no mgr appreciated  Abdomen: soft, not tender, not distended, audible bowel sound, no hepatosplenomegaly, no palpable masses, no abdominal bruits  Extremities: no cyanosis, clubbing or edema.   Neuro: A, Ox3, no focal signs.  Breast exam both breast with 2 cm surgical scars from benign lesions removal.  Right breast with 4 cm mass behind the areola.  Heart on palpation.    Pelvic exam: Normal external genitals, normal appearing perineum, normal appearing urethra,  vaginal mucosa pink, no discharge, Cervix appears normal, Pap smear obtained. On bimanual exam, I did not feel any uterus or ovarian masses, and she denies any tenderness.         Jacquie Storm MD  Internal Medicine        SUBJECTIVE:   CC: Lubna is an 48 year old who presents for preventive health visit.       6/27/2023     7:04 AM   Additional Questions   Roomed by Crystal Blackmon     Healthy Habits:    Getting at least 3 servings of Calcium per day:  NO    Bi-annual eye exam:  NO    Dental care twice a year:  NO    Sleep apnea or symptoms of sleep apnea:  None    Diet:  Regular (no restrictions)    Frequency of exercise:  None    Taking medications regularly:  Yes    Medication side effects:  None    PHQ-2 Total Score:    Additional concerns today:  No                      Social History     Tobacco Use     Smoking status: Never     Passive exposure: " Never     Smokeless tobacco: Never   Substance Use Topics     Alcohol use: No     Alcohol/week: 0.0 standard drinks of alcohol             2023     2:32 PM   Alcohol Use   Prescreen: >3 drinks/day or >7 drinks/week? No     Reviewed orders with patient.  Reviewed health maintenance and updated orders accordingly - Yes  Labs reviewed in EPIC    Breast Cancer Screenin/23/2022     9:09 AM   Breast CA Risk Assessment (FHS-7)   Do you have a family history of breast, colon, or ovarian cancer? No / Unknown           Pertinent mammograms are reviewed under the imaging tab.    History of abnormal Pap smear:       Latest Ref Rng & Units 10/5/2018     9:20 AM 10/5/2018     9:00 AM 2015     2:03 PM   PAP / HPV   PAP (Historical)   NIL  NIL    HPV 16 DNA NEG^Negative Negative   Negative    HPV 18 DNA NEG^Negative Negative   Negative    Other HR HPV NEG^Negative Negative   Negative      Reviewed and updated as needed this visit by clinical staff   Tobacco  Allergies  Meds   Med Hx  Surg Hx  Fam Hx          Reviewed and updated as needed this visit by Provider                     Review of Systems   Constitutional: Negative for chills and fever.   HENT: Negative for congestion, ear pain, hearing loss and sore throat.    Eyes: Negative for pain and visual disturbance.   Respiratory: Negative for cough and shortness of breath.    Cardiovascular: Negative for chest pain, palpitations and peripheral edema.   Gastrointestinal: Negative for abdominal pain, constipation, diarrhea, heartburn, hematochezia and nausea.   Breasts:  Positive for breast mass. Negative for tenderness and discharge.   Genitourinary: Negative for dysuria, frequency, genital sores, hematuria, pelvic pain, urgency, vaginal bleeding and vaginal discharge.   Musculoskeletal: Negative for arthralgias, joint swelling and myalgias.   Skin: Negative for rash.   Neurological: Positive for weakness. Negative for dizziness, headaches and  paresthesias.   Psychiatric/Behavioral: Positive for mood changes. The patient is not nervous/anxious.          Patient has been advised of split billing requirements and indicates understanding: Yes At the check in, at the        COUNSELING:  Reviewed preventive health counseling, as reflected in patient instructions       Regular exercise       Healthy diet/nutrition        She reports that she has never smoked. She has never been exposed to tobacco smoke. She has never used smokeless tobacco.          Jacquie Georges MD  [facility]

## 2023-06-29 ENCOUNTER — VIRTUAL VISIT (OUTPATIENT)
Dept: INTERNAL MEDICINE | Facility: CLINIC | Age: 48
End: 2023-06-29
Payer: COMMERCIAL

## 2023-06-29 ENCOUNTER — HOSPITAL ENCOUNTER (OUTPATIENT)
Dept: MAMMOGRAPHY | Facility: CLINIC | Age: 48
Discharge: HOME | End: 2023-06-29
Attending: INTERNAL MEDICINE
Payer: COMMERCIAL

## 2023-06-29 ENCOUNTER — HOSPITAL ENCOUNTER (OUTPATIENT)
Dept: ULTRASOUND IMAGING | Facility: CLINIC | Age: 48
Discharge: HOME | End: 2023-06-29
Attending: INTERNAL MEDICINE
Payer: COMMERCIAL

## 2023-06-29 ENCOUNTER — TELEPHONE (OUTPATIENT)
Dept: INTERNAL MEDICINE | Facility: CLINIC | Age: 48
End: 2023-06-29

## 2023-06-29 DIAGNOSIS — N63.41 SUBAREOLAR MASS OF RIGHT BREAST: ICD-10-CM

## 2023-06-29 DIAGNOSIS — R59.0 CERVICAL ADENOPATHY: ICD-10-CM

## 2023-06-29 DIAGNOSIS — Z02.9 ADMINISTRATIVE ENCOUNTER: ICD-10-CM

## 2023-06-29 DIAGNOSIS — N63.41 SUBAREOLAR MASS OF RIGHT BREAST: Primary | ICD-10-CM

## 2023-06-29 LAB
BKR LAB AP GYN ADEQUACY: NORMAL
BKR LAB AP GYN INTERPRETATION: NORMAL
BKR LAB AP HPV REFLEX: NORMAL
BKR LAB AP PREVIOUS ABNORMAL: NORMAL
PATH REPORT.COMMENTS IMP SPEC: NORMAL
PATH REPORT.COMMENTS IMP SPEC: NORMAL
PATH REPORT.RELEVANT HX SPEC: NORMAL

## 2023-06-29 PROCEDURE — 76642 ULTRASOUND BREAST LIMITED: CPT | Mod: RT

## 2023-06-29 PROCEDURE — 77062 BREAST TOMOSYNTHESIS BI: CPT

## 2023-06-29 PROCEDURE — 99213 OFFICE O/P EST LOW 20 MIN: CPT | Mod: 95 | Performed by: INTERNAL MEDICINE

## 2023-06-29 NOTE — PROGRESS NOTES
This is a VIDEO ( using Doximity)  encounter with the patient.       Location of the provider : office   Location of the patient : home      07:00 --- 07:24           Dr Storm's note      Patient's instructions / PLAN:                                                        Plan:  1. FMLA until Sept 15  2. Follow up appointment  at 08:40  3. Schedule appointment with the surgeon       ASSESSMENT & PLAN:                                                      (N63.41) Subareolar mass of right breast  (primary encounter diagnosis)  Comment: mamm scheduled today  Plan: Adult General Surg Referral            (Z02.9) Administrative encounter  Comment:   Plan: as above     (R59.0) Cervical adenopathy  Comment:   Plan: CT scheduled          Chief complaint:                                                      brest mass, neck adenop  FMLA    SUBJECTIVE:                                                    History of present illness:    Discuss leave of absence because lost  on  - sudden death  FMLA -  - SEpt 15  Reassess on     Breast mass -- mamm today  Advised to see Surgeon  I discussed her case w the surgeon  Neck CT scheduled next week - we will f/u        Review of Systems:                                                      ROS: negative for fever, chills, cough, wheezes, chest pain, shortness of breath, vomiting, abdominal pain, leg swelling       OBJECTIVE:           An actual physical exam can't be done during phone visit   A limited exam can sometimes be performed by video visit   NAD      PMHx: reviewed  Past Medical History:   Diagnosis Date     CARDIOVASCULAR SCREENING; LDL GOAL LESS THAN 160      Chronic lower back pain      Leukopenia May 2012     NO ACTIVE PROBLEMS      Vertigo 2013     Vitamin D deficiencies       PSHx: reviewed  Past Surgical History:   Procedure Laterality Date     BREAST BIOPSY, RT/LT        SECTION          Meds: reviewed  Current  Outpatient Medications   Medication Sig Dispense Refill     Cholecalciferol (VITAMIN D) 1000 UNITS capsule Take 1 capsule by mouth daily       cyanocobalamin (VITAMIN B-12) 1000 MCG tablet Take 1,000 mcg by mouth daily       ferrous sulfate 220 (44 Fe) MG/5ML ELIX Take 220 mg by mouth daily       traZODone (DESYREL) 50 MG tablet Take 0.5-2 tablets ( mg) by mouth At Bedtime 30 tablet 3       Soc Hx: reviewed  Fam Hx: reviewed        Chart documentation was completed, in part, with [employer] voice-recognition software. Even though reviewed, some grammatical, spelling, and word errors may remain.    Jacquie Storm MD  Internal Medicine

## 2023-06-29 NOTE — PATIENT INSTRUCTIONS
Plan:  1. FMLA until Sept 15  2. Follow up appointment Sept 11 at 08:40  3. Schedule appointment with the surgeon

## 2023-07-03 LAB
HUMAN PAPILLOMA VIRUS 16 DNA: NEGATIVE
HUMAN PAPILLOMA VIRUS 18 DNA: NEGATIVE
HUMAN PAPILLOMA VIRUS FINAL DIAGNOSIS: NORMAL
HUMAN PAPILLOMA VIRUS OTHER HR: NEGATIVE

## 2023-07-15 ENCOUNTER — HOSPITAL ENCOUNTER (EMERGENCY)
Facility: CLINIC | Age: 48
Discharge: HOME | End: 2023-07-15
Attending: EMERGENCY MEDICINE | Admitting: EMERGENCY MEDICINE
Payer: COMMERCIAL

## 2023-07-15 VITALS
HEART RATE: 68 BPM | RESPIRATION RATE: 16 BRPM | TEMPERATURE: 96.9 F | SYSTOLIC BLOOD PRESSURE: 100 MMHG | DIASTOLIC BLOOD PRESSURE: 65 MMHG | OXYGEN SATURATION: 100 %

## 2023-07-15 DIAGNOSIS — S00.36XA INSECT BITE OF NOSE, INITIAL ENCOUNTER: Primary | ICD-10-CM
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
## 2023-07-15 PROCEDURE — 99282 EMERGENCY DEPT VISIT SF MDM: CPT

## 2023-07-15 ASSESSMENT — ENCOUNTER SYMPTOMS
VOMITING: 0
SHORTNESS OF BREATH: 0
DIZZINESS: 0
SORE THROAT: 0
ABDOMINAL PAIN: 0
LIGHT-HEADEDNESS: 0
FACIAL SWELLING: 0
NAUSEA: 0
TROUBLE SWALLOWING: 0
WOUND: 1
CHOKING: 0

## 2023-07-15 ASSESSMENT — ACTIVITIES OF DAILY LIVING (ADL): ADLS_ACUITY_SCORE: 33

## 2023-07-15 NOTE — ED PROVIDER NOTES
History     Chief Complaint:  Insect Bite       The history is provided by the patient.      Lubna Corcoran is a 48 year old female who presents with insect bite to her nose. The patient states that she was outside at her shed around 1800 opening the door, then something flew onto her nose followed by a sharp pain, then burning and numbness. She believes it was a flying insect and has noticed some hornets and bees around her shed in the past. She does not have any known allergies.  She states that she immediately grabbed on her nose and pulled the insect off and threw it.  While she states that she is unable to clearly identify the offending insect, she believes it looks like a flying insect in denies the possibility of it being a spider bite.  She is not allergic to bees, wasps, or hornets.  She does not have history of allergic or anaphylactic reactions.    Review of Systems   HENT: Negative for congestion, facial swelling, rhinorrhea, sore throat and trouble swallowing.    Eyes: Negative for pain and visual disturbance.   Respiratory: Negative for choking and shortness of breath.    Cardiovascular: Negative for chest pain.   Gastrointestinal: Negative for abdominal pain, nausea and vomiting.   Skin: Positive for wound.   Neurological: Negative for dizziness, light-headedness and headaches.     Independent Historian:   None - Patient Only    Review of External Notes:   None      Medications:    Desyrel     Past Medical History:    Leukopenia   Vertigo   Sciatica   Mastodynia   Incisional hernia     Past Surgical History:     section   Breast biopsy      Physical Exam     Patient Vitals for the past 24 hrs:   BP Temp Temp src Pulse Resp SpO2   07/15/23 1946 100/65 -- -- 68 16 100 %   07/15/23 1821 116/69 96.9  F (36.1  C) Temporal 77 16 98 %        Physical Exam  Constitutional:       Appearance: Normal appearance.      General: Not in acute distress.  HENT:      Head: Normocephalic and atraumatic.      Nose: Small pinpoint clot observed over lateral aspect of left nostril.  No associated surrounding erythema, swelling, retained stinger/foreign body, or obvious lesions.  Inside of nostril was observed as well, no evidence of penetrating trauma or wounds.  Eyes:      Extraocular Movements: Extraocular movements intact.      Conjunctiva/sclera: Conjunctivae normal.   Cardiovascular:      Rate and Rhythm: Normal rate and regular rhythm.   Pulmonary:      Effort: Pulmonary effort is normal. No respiratory distress.      Lungs: Clear to auscultation bilaterally.  No wheezing.  No stridor.  Abdominal:      General: Abdomen is flat. There is no distension.   Musculoskeletal:         General: No swelling or deformity.      Cervical back: Normal range of motion. No rigidity.   Skin:     Coloration: Skin is not jaundiced or pale.   Neurological:      General: No focal deficit present.      Mental Status: Alert and oriented to person, place, and time.   Psychiatric:         Mood and Affect: Mood normal.         Behavior: Behavior normal.      Suspected insect bite site pointed out by tip of needle cap      Emergency Department Course     Emergency Department Course & Assessments:       Interventions:  None      Independent Interpretation (X-rays, CTs, rhythm strip):  None    Assessment/Consultations/Discussion of Management or Tests:  ED Course as of 07/16/23 0116   Sat Jul 15, 2023   1852 I examined the patient and obtained history as shown above   2002 Completion of monitoring period.  No development of reaction in nearly 2 hours in the ED. patient would like discharge home at this time.  Recommend patient to follow-up with PCP as needed.  Return to ER if concerning symptoms such as symptoms of infection or allergic reaction. Discussed return precautions.  Answered all questions.  Patient voiced understanding and agreement with plan.     Social Determinants of Health affecting care:   None    Disposition:  The patient was  discharged to home.     Impression & Plan    CMS Diagnoses: None    Medical Decision Makin-year-old female as described above presents to the emergency department for bite by unknown insect to the left nostril.  Patient hemodynamically stable at time evaluation.  Afebrile.  No evidence of allergic reaction or anaphylaxis.  Patient reports this is likely secondary to flying insect bite.  Most concerning species such as hornets and wasps considered and most concerning reaction would be anaphylaxis at this time.  Patient does not demonstrate any of those symptoms.  Likely monitor in the ED and discharge if no symptom progression or development of systemic reaction.  Low suspicion for spider bite such as black  given geographic location.  Additionally, patient does not demonstrate typical symptoms such as nausea, vomiting, or abdominal pain secondary to neurotoxin.  There is always the possibility of a brown recluse bite given the year she had, although no evidence of erythema or pain out of proportion.  Advised for patient to continue to monitor area for signs and symptoms for local reaction or wound/infection development.  Follow-up with PCP as needed.  No indication for antibiotics or blood work at this time due to well appearance, normal vital sign, and benign physical examination.  Likely discharge after monitoring period.  Discussed care plan with patient who voiced understanding and agreement with plan.  Answered all questions.  Additional work-up and orders as listed in chart.    Please refer to ED course above for details on the patient's treatment course and any changes or updates in care plan beyond my initial evaluation and MDM.    Diagnosis:    ICD-10-CM    1. Insect bite of nose, initial encounter  S00.36XA     W57.XXXA            Discharge Medications:  Discharge Medication List as of 7/15/2023  8:02 PM         Scribe Disclosure:  I, Kole Menon, am serving as a scribe at 6:50 PM on  7/15/2023 to document services personally performed by Leo Childress DO based on my observations and the provider's statements to me.     7/15/2023   Leo Childress DO Yeh, Ferris, DO  07/16/23 0120

## 2023-07-15 NOTE — ED TRIAGE NOTES
Patient was bit on the nose by an insect, just PTA. Patient states it's painful and numb. No reddened area or swelling noted.

## 2023-07-16 ASSESSMENT — ENCOUNTER SYMPTOMS
RHINORRHEA: 0
HEADACHES: 0
EYE PAIN: 0

## 2023-08-04 LAB — NONINV COLON CA DNA+OCC BLD SCRN STL QL: NEGATIVE

## 2023-10-07 ENCOUNTER — HOSPITAL ENCOUNTER (OUTPATIENT)
Dept: CT IMAGING | Facility: CLINIC | Age: 48
Discharge: HOME | End: 2023-10-07
Attending: INTERNAL MEDICINE | Admitting: INTERNAL MEDICINE
Payer: COMMERCIAL

## 2023-10-07 DIAGNOSIS — R59.0 CERVICAL ADENOPATHY: ICD-10-CM

## 2023-10-07 PROCEDURE — 250N000011 HC RX IP 250 OP 636: Performed by: INTERNAL MEDICINE

## 2023-10-07 PROCEDURE — 70491 CT SOFT TISSUE NECK W/DYE: CPT

## 2023-10-07 RX ORDER — IOPAMIDOL 755 MG/ML
500 INJECTION, SOLUTION INTRAVASCULAR ONCE
Status: COMPLETED | OUTPATIENT
Start: 2023-10-07 | End: 2023-10-07

## 2023-10-07 RX ADMIN — IOPAMIDOL 90 ML: 755 INJECTION, SOLUTION INTRAVENOUS at 08:44

## 2024-02-23 ENCOUNTER — TELEPHONE (OUTPATIENT)
Dept: INTERNAL MEDICINE | Facility: CLINIC | Age: 49
End: 2024-02-23

## 2024-02-23 ENCOUNTER — MYC MEDICAL ADVICE (OUTPATIENT)
Dept: INTERNAL MEDICINE | Facility: CLINIC | Age: 49
End: 2024-02-23
Payer: COMMERCIAL

## 2024-02-23 NOTE — TELEPHONE ENCOUNTER
Life insurance form dropped off at our . Patient will need an appointment for this. I will send patient a my chart message.

## 2024-03-01 ENCOUNTER — OFFICE VISIT (OUTPATIENT)
Dept: INTERNAL MEDICINE | Facility: CLINIC | Age: 49
End: 2024-03-01
Payer: COMMERCIAL

## 2024-03-01 VITALS
SYSTOLIC BLOOD PRESSURE: 85 MMHG | HEIGHT: 61 IN | BODY MASS INDEX: 18.33 KG/M2 | OXYGEN SATURATION: 100 % | TEMPERATURE: 98.6 F | WEIGHT: 97.1 LBS | RESPIRATION RATE: 20 BRPM | DIASTOLIC BLOOD PRESSURE: 60 MMHG | HEART RATE: 66 BPM

## 2024-03-01 DIAGNOSIS — Z02.9 ADMINISTRATIVE ENCOUNTER: Primary | ICD-10-CM

## 2024-03-01 DIAGNOSIS — F43.21 GRIEVING: ICD-10-CM

## 2024-03-01 PROCEDURE — 99214 OFFICE O/P EST MOD 30 MIN: CPT | Performed by: INTERNAL MEDICINE

## 2024-03-01 ASSESSMENT — PAIN SCALES - GENERAL: PAINLEVEL: NO PAIN (0)

## 2024-03-01 NOTE — PROGRESS NOTES
"Dr Storm's note      Patient's instructions / PLAN:                                                        Plan:  Forms - we will fax   2. Schedule ANNUAL EXAM for 2024  3. May cancel the  appointment       ASSESSMENT & PLAN:                                                      (Z02.9) Administrative encounter  (primary encounter diagnosis)  Comment:   Plan: Primary Care - Care Coordination Referral            (F43.21) Grieving  Comment:   Plan: Primary Care - Care Coordination Referral                 Chief complaint:                                                      Forms  Grieving      SUBJECTIVE:                                                    History of present illness:    Medical evaluation for Life Insurance     almost a year ago  Interested in support groups       Subjective   Lubna is a 48 year old, presenting for the following health issues:  Gastric Problem and Forms      3/1/2024     9:33 AM   Additional Questions   Roomed by Crystal Blackmon     History of Present Illness       Reason for visit:  Paper work and stomach check up    She eats 0-1 servings of fruits and vegetables daily.She consumes 0 sweetened beverage(s) daily.She exercises with enough effort to increase her heart rate 20 to 29 minutes per day.  She exercises with enough effort to increase her heart rate 3 or less days per week.   She is not taking prescribed medications regularly due to remembering to take.         Review of Systems:                                                      ROS: negative for fever, chills, cough, wheezes, chest pain, shortness of breath, vomiting, abdominal pain, leg swelling       OBJECTIVE:             Physical exam:  Blood pressure 96/59, pulse 91, temperature 98.6  F (37  C), temperature source Tympanic, resp. rate 20, height 1.537 m (5' 0.5\"), weight 44 kg (97 lb 1.6 oz), SpO2 100%, not currently breastfeeding.     NAD, appears comfortable  Skin: no rashes   Neck: supple, no " JVD,  No thyroidmegaly. Lymph nodes nonpalpable cervical and supraclavicular.  Chest: clear to auscultation bilaterally, good respiratory effort  Heart: S1 S2, RRR, no mgr appreciated  Abdomen: soft, not tender,old diastasis rectus abd muscles   Extremities: no edema,   Neurologic: A, Ox3, no focal signs appreciated    PMHx: reviewed  Past Medical History:   Diagnosis Date    CARDIOVASCULAR SCREENING; LDL GOAL LESS THAN 160     Chronic lower back pain     Leukopenia May 2012    NO ACTIVE PROBLEMS     Vertigo 2013    Vitamin D deficiencies       PSHx: reviewed  Past Surgical History:   Procedure Laterality Date    BREAST BIOPSY, RT/LT       SECTION          Meds: reviewed  Current Outpatient Medications   Medication Sig Dispense Refill    Cholecalciferol (VITAMIN D) 1000 UNITS capsule Take 1 capsule by mouth daily      cyanocobalamin (VITAMIN B-12) 1000 MCG tablet Take 1,000 mcg by mouth daily      ferrous sulfate 220 (44 Fe) MG/5ML ELIX Take 220 mg by mouth daily      traZODone (DESYREL) 50 MG tablet Take 0.5-2 tablets ( mg) by mouth At Bedtime 30 tablet 3       Soc Hx: reviewed  Fam Hx: reviewed      Chart documentation was completed, in part, with [employer] voice-recognition software. Even though reviewed, some grammatical, spelling, and word errors may remain.      Jacquie Storm MD  Internal Medicine      Signed Electronically by: Jacquie Georges MD

## 2024-03-01 NOTE — PATIENT INSTRUCTIONS
Plan:  Forms - we will fax   2. Schedule ANNUAL EXAM for July 2024  3. May cancel the March 22 appointment

## 2024-03-04 ENCOUNTER — PATIENT OUTREACH (OUTPATIENT)
Dept: CARE COORDINATION | Facility: CLINIC | Age: 49
End: 2024-03-04
Payer: COMMERCIAL

## 2024-03-04 NOTE — LETTER
[facility] CARE COORDINATION  [facility] 6, 2024    Lubna Vazquezfaye  [address]   [facility] [address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hais JEWELL Public Health  Community Health Worker  [facility]:  [place], [place] & [place]   Clinic Care Coordination  [phone]       Dear Lubna,    I am a clinic community health worker who works with Jacquie Georges MD with the [facility]. I have been trying to reach you recently to introduce Clinic Care Coordination. Below is a description of clinic care coordination and how I can further assist you.       The clinic care coordination team is made up of a registered nurse, , financial resource worker and community health worker who understand the health care system. The goal of clinic care coordination is to help you manage your health and improve access to the health care system. Our team works alongside your provider to assist you in determining your health and social needs. We can help you obtain health care and community resources, providing you with necessary  information and education. We can work with you through any barriers and develop a care plan that helps coordinate and strengthen the communication between you and your care team.  Our services are voluntary and are offered without charge to you personally.    Please feel free to contact me with any questions or concerns regarding care coordination and what we can offer.      We are focused on providing you with the highest-quality healthcare experience possible.    Sincerely,       Thais CHRISTOPHER. Public Health  Community Health Worker  [facility]:  [facility] Care Coordination  [phone]

## 2024-03-04 NOTE — PROGRESS NOTES
Clinic Care Coordination Contact  [facility]/Voicemail    Clinical Data: Care Coordinator Outreach    Outreach Documentation Number of Outreach Attempt   3/4/2024  10:02 AM 1       Left message on patient's voicemail with call back information and requested return call with the assistance of an Setswana .    Plan: Care Coordinator will try to reach patient again in 1-2 business days.    Thais JEWELL [facility]  Community Health Worker  [facility] Clin[facility]:  [facility] [place]   Clinic Care Coordination  [phone]

## 2024-03-06 ENCOUNTER — APPOINTMENT (OUTPATIENT)
Dept: INTERPRETER SERVICES | Facility: CLINIC | Age: 49
End: 2024-03-06
Payer: COMMERCIAL

## 2024-03-06 NOTE — PROGRESS NOTES
Clinic Care Coordination Contact  [facility]/Voicemail    Clinical Data: Care Coordinator Outreach    Outreach Documentation Number of Outreach Attempt   3/4/2024  10:02 AM 1   3/6/2024  11:03 AM 2       Left message on patient's voicemail with call back information and requested return call.    Plan: Care Coordinator will send care coordination introduction letter with care coordinator contact information and explanation of care coordination services via [employer]. Care Coordinator will do no further outreaches at this time.      Thais JEWELL [facility]  Community Health Worker  [facility] Clinics:  [facility] & [facility] Care Coordination  [phone]

## 2024-03-06 NOTE — PROGRESS NOTES
Writer received a VM from a phone number [phone]. Phone number not associated with the Patient's file, however the caller ID had the same last name as the Patient. Writer is under the impression that they were trying to reach Writer as CC has been trying to reach the Patient to initiate CC services. Writer did call back with an  as it was difficult to make out the VM, however the number was unable to contact. Left a generic VM.    Thais JEWELL [facility]  Community Health Worker  [facility]:  [facility] & [facility] Care Coordination  [phone]

## 2024-04-10 ENCOUNTER — MYC MEDICAL ADVICE (OUTPATIENT)
Dept: INTERNAL MEDICINE | Facility: CLINIC | Age: 49
End: 2024-04-10
Payer: COMMERCIAL

## 2024-07-01 ENCOUNTER — HOSPITAL ENCOUNTER (OUTPATIENT)
Dept: MAMMOGRAPHY | Facility: CLINIC | Age: 49
Discharge: HOME | End: 2024-07-01
Attending: INTERNAL MEDICINE | Admitting: INTERNAL MEDICINE
Payer: COMMERCIAL

## 2024-07-01 DIAGNOSIS — Z12.31 VISIT FOR SCREENING MAMMOGRAM: ICD-10-CM

## 2024-07-01 PROCEDURE — 77063 BREAST TOMOSYNTHESIS BI: CPT

## 2024-08-17 ENCOUNTER — HEALTH MAINTENANCE LETTER (OUTPATIENT)
Age: 49
End: 2024-08-17

## 2024-09-04 ENCOUNTER — OFFICE VISIT (OUTPATIENT)
Dept: INTERNAL MEDICINE | Facility: CLINIC | Age: 49
End: 2024-09-04
Payer: COMMERCIAL

## 2024-09-04 ENCOUNTER — TELEPHONE (OUTPATIENT)
Dept: INTERNAL MEDICINE | Facility: CLINIC | Age: 49
End: 2024-09-04

## 2024-09-04 VITALS
HEIGHT: 60 IN | SYSTOLIC BLOOD PRESSURE: 90 MMHG | WEIGHT: 99 LBS | OXYGEN SATURATION: 100 % | RESPIRATION RATE: 20 BRPM | BODY MASS INDEX: 19.44 KG/M2 | TEMPERATURE: 98 F | HEART RATE: 56 BPM | DIASTOLIC BLOOD PRESSURE: 58 MMHG

## 2024-09-04 DIAGNOSIS — M54.50 CHRONIC LOW BACK PAIN, UNSPECIFIED BACK PAIN LATERALITY, UNSPECIFIED WHETHER SCIATICA PRESENT: ICD-10-CM

## 2024-09-04 DIAGNOSIS — G89.29 CHRONIC LOW BACK PAIN, UNSPECIFIED BACK PAIN LATERALITY, UNSPECIFIED WHETHER SCIATICA PRESENT: ICD-10-CM

## 2024-09-04 DIAGNOSIS — Z11.4 SCREENING FOR HIV (HUMAN IMMUNODEFICIENCY VIRUS): ICD-10-CM

## 2024-09-04 DIAGNOSIS — K42.9 UMBILICAL HERNIA WITHOUT OBSTRUCTION AND WITHOUT GANGRENE: ICD-10-CM

## 2024-09-04 DIAGNOSIS — Z11.59 NEED FOR HEPATITIS C SCREENING TEST: ICD-10-CM

## 2024-09-04 DIAGNOSIS — Z00.00 ROUTINE GENERAL MEDICAL EXAMINATION AT A HEALTH CARE FACILITY: Primary | ICD-10-CM

## 2024-09-04 DIAGNOSIS — Z13.6 SCREENING FOR ISCHEMIC HEART DISEASE: ICD-10-CM

## 2024-09-04 DIAGNOSIS — E55.9 VITAMIN D DEFICIENCY: ICD-10-CM

## 2024-09-04 LAB
ERYTHROCYTE [DISTWIDTH] IN BLOOD BY AUTOMATED COUNT: 13.1 % (ref 10–15)
HCT VFR BLD AUTO: 41 % (ref 35–47)
HGB BLD-MCNC: 13.6 G/DL (ref 11.7–15.7)
MCH RBC QN AUTO: 29.8 PG (ref 26.5–33)
MCHC RBC AUTO-ENTMCNC: 33.2 G/DL (ref 31.5–36.5)
MCV RBC AUTO: 90 FL (ref 78–100)
PLATELET # BLD AUTO: 250 10E3/UL (ref 150–450)
RBC # BLD AUTO: 4.56 10E6/UL (ref 3.8–5.2)
WBC # BLD AUTO: 4 10E3/UL (ref 4–11)

## 2024-09-04 PROCEDURE — 80061 LIPID PANEL: CPT | Performed by: INTERNAL MEDICINE

## 2024-09-04 PROCEDURE — 99213 OFFICE O/P EST LOW 20 MIN: CPT | Mod: 25 | Performed by: INTERNAL MEDICINE

## 2024-09-04 PROCEDURE — 85027 COMPLETE CBC AUTOMATED: CPT | Performed by: INTERNAL MEDICINE

## 2024-09-04 PROCEDURE — 80053 COMPREHEN METABOLIC PANEL: CPT | Performed by: INTERNAL MEDICINE

## 2024-09-04 PROCEDURE — 86803 HEPATITIS C AB TEST: CPT | Performed by: INTERNAL MEDICINE

## 2024-09-04 PROCEDURE — 87389 HIV-1 AG W/HIV-1&-2 AB AG IA: CPT | Performed by: INTERNAL MEDICINE

## 2024-09-04 PROCEDURE — 82306 VITAMIN D 25 HYDROXY: CPT | Performed by: INTERNAL MEDICINE

## 2024-09-04 PROCEDURE — 36415 COLL VENOUS BLD VENIPUNCTURE: CPT | Performed by: INTERNAL MEDICINE

## 2024-09-04 PROCEDURE — 99396 PREV VISIT EST AGE 40-64: CPT | Performed by: INTERNAL MEDICINE

## 2024-09-04 PROCEDURE — 84443 ASSAY THYROID STIM HORMONE: CPT | Performed by: INTERNAL MEDICINE

## 2024-09-04 NOTE — NURSING NOTE
Chief Complaint   Patient presents with    Physical     fasting    Forms     initial BP 90/58   Pulse 56   Temp 98  F (36.7  C) (Oral)   Resp 20   Ht 1.524 m (5')   Wt 44.9 kg (99 lb)   LMP  (LMP Unknown)   SpO2 100%   BMI 19.33 kg/m   Estimated body mass index is 19.33 kg/m  as calculated from the following:    Height as of this encounter: 1.524 m (5').    Weight as of this encounter: 44.9 kg (99 lb)..  bp completed using cuff size regular  FAHAD MA LPN

## 2024-09-04 NOTE — PATIENT INSTRUCTIONS
Plan:  Surgeon referral   2.  Labs today - [address]   3. [place] papers  4. Next ANNUAL EXAM after Sept 5, 2025

## 2024-09-04 NOTE — PROGRESS NOTES
Dr Storm's note    Patient's instructions / PLAN:                                                        Plan:  Surgeon referral   2.  Labs today - [address]   3. FMLA papers  4. Next ANNUAL EXAM after Sept 5, 2025        ASSESSMENT & PLAN:                                                      (Z00.00) Routine general medical examination at a health care facility  (primary encounter diagnosis)  Comment:   Plan: Comprehensive metabolic panel, Lipid panel         reflex to direct LDL Fasting, CBC with         platelets, Vitamin D Deficiency, TSH with free         T4 reflex            (M54.50,  G89.29) Chronic low back pain, unspecified back pain laterality, unspecified whether sciatica present  Comment:   Plan: FMLA papers for intermittent absence    (E55.9) Vitamin D deficiency  Comment:   Plan: Vitamin D Deficiency            (Z11.4) Screening for HIV (human immunodeficiency virus)  Comment:   Plan: HIV Antigen Antibody Combo            (Z11.59) Need for hepatitis C screening test  Comment:   Plan: Hepatitis C Screen Reflex to HCV RNA Quant and         Genotype            (K42.9) Umbilical hernia without obstruction and without gangrene  Comment: She can reduce it by herself  Plan: Adult Gen Surg  Referral            (Z13.6) Screening for ischemic heart disease  Comment:   Plan: Lipid panel reflex to direct LDL Fasting               Chief Complaint:                                                        Annual exam  Follow up chronic medical problems      SUBJECTIVE:                                                    History of present illness     We reviewed the chronic medical problems as above.   I reviewed the recent tests results in Epic       ROS:                                                      ROS: negative for fever, chills, cough, wheezes, chest pain, shortness of breath, vomiting, abdominal pain, leg swelling         PMHx: - reviewed  Past Medical History:   Diagnosis Date    CARDIOVASCULAR  SCREENING; LDL GOAL LESS THAN 160     Chronic lower back pain     Leukopenia 2012    NO ACTIVE PROBLEMS     Vertigo 2013    Vitamin D deficiencies        PSHx: reviewed  Past Surgical History:   Procedure Laterality Date    BREAST BIOPSY, RT/LT      BREAST SURGERY  Lump removed        SECTION          Soc Hx: No daily alcohol, no smoking  Social History     Socioeconomic History    Marital status:      Spouse name: Not on file    Number of children: Not on file    Years of education: Not on file    Highest education level: Not on file   Occupational History    Not on file   Tobacco Use    Smoking status: Never     Passive exposure: Never    Smokeless tobacco: Never   Vaping Use    Vaping status: Never Used   Substance and Sexual Activity    Alcohol use: No    Drug use: No    Sexual activity: Not Currently     Partners: Male   Other Topics Concern    Parent/sibling w/ CABG, MI or angioplasty before 65F 55M? Yes   Social History Narrative    Not on file     Social Determinants of Health     Financial Resource Strain: Low Risk  (9/3/2024)    Financial Resource Strain     Within the past 12 months, have you or your family members you live with been unable to get utilities (heat, electricity) when it was really needed?: No   Food Insecurity: Low Risk  (9/3/2024)    Food Insecurity     Within the past 12 months, did you worry that your food would run out before you got money to buy more?: No     Within the past 12 months, did the food you bought just not last and you didn t have money to get more?: No   Transportation Needs: Low Risk  (9/3/2024)    Transportation Needs     Within the past 12 months, has lack of transportation kept you from medical appointments, getting your medicines, non-medical meetings or appointments, work, or from getting things that you need?: No   Physical Activity: Insufficiently Active (9/3/2024)    Exercise Vital Sign     Days of Exercise per Week: 3 days      Minutes of Exercise per Session: 30 min   Stress: Stress Concern Present (9/3/2024)    [employer] - Occupational Stress Questionnaire     Feeling of Stress : Very much   Social Connections: Unknown (9/3/2024)    Social Connection and Isolation Panel [NHANES]     Frequency of Communication with Friends and Family: Not on file     Frequency of Social Gatherings with Friends and Family: Three times a week     Attends Denominational Services: Not on file     Active Member of Clubs or Organizations: Not on file     Attends Club or Organization Meetings: Not on file     Marital Status: Not on file   Interpersonal Safety: Low Risk  (9/4/2024)    Interpersonal Safety     Do you feel physically and emotionally safe where you currently live?: Yes     Within the past 12 months, have you been hit, slapped, kicked or otherwise physically hurt by someone?: No     Within the past 12 months, have you been humiliated or emotionally abused in other ways by your partner or ex-partner?: No   Housing Stability: High Risk (9/3/2024)    Housing Stability     Do you have housing? : No     Are you worried about losing your housing?: No        Fam Hx: reviewed  Family History   Problem Relation Age of Onset    No Known Problems Mother     Diabetes Father     Heart Disease Father     Breast Cancer No family hx of     Ovarian Cancer No family hx of          Screening: reviewed      All: reviewed    Meds: reviewed  Current Outpatient Medications   Medication Sig Dispense Refill    Cholecalciferol (VITAMIN D) 1000 UNITS capsule Take 1 capsule by mouth daily      cyanocobalamin (VITAMIN B-12) 1000 MCG tablet Take 1,000 mcg by mouth daily      Multiple Vitamin (MULTIVITAMIN ADULT PO) Take by mouth.      ferrous sulfate 220 (44 Fe) MG/5ML ELIX Take 220 mg by mouth daily      traZODone (DESYREL) 50 MG tablet Take 0.5-2 tablets ( mg) by mouth At Bedtime 30 tablet 3       OBJECTIVE:                                                     Physical Exam :  Blood pressure 90/58, pulse 56, temperature 98  F (36.7  C), temperature source Oral, resp. rate 20, height 1.524 m (5'), weight 44.9 kg (99 lb), SpO2 100%, not currently breastfeeding.     NAD, appears comfortable  Skin clear, no rashes  Neck: supple, no JVD,  no thyroidmegaly  Lymph nodes non palpable in the cervical, supraclavicular axillaries,   Chest: clear to auscultation with good respiratory effort  Cardiac: S1S2, RRR, no mgr appreciated  Abdomen: soft, not tender, not distended, audible bowel sound, no hepatosplenomegaly, no palpable masses, no abdominal bruits.  Hernia above the umbilical area, reducible, about 3 cm wall defect  Extremities: no cyanosis, clubbing or edema.   Neuro: A, Ox3, no focal signs.  Breast exam in supine and erect position: they are symmetrical, no skin changes, no tenderness or nodes on palpation. Nipples are erect, no skin lesions, no discharge on pressure.    Pelvic exam: deferred, no symptoms, no hx of abnormal pap        Patient has been advised of split billing requirements and indicates understanding: Yes.  At the check in, at the      Jacquie Storm MD  Internal Medicine       ############################################    Preventive Care Visit  [facility]  Jacquie Georges MD, Internal Medicine  Sep 4, 2024          Richard Calvo is a 49 year old, presenting for the following:  Physical (fasting) and Forms        9/4/2024     1:38 PM   Additional Questions   Roomed by Josiane MCAIEL        Health Care Directive  Patient does not have a Health Care Directive or Living Will: Discussed advance care planning with patient; information given to patient to review.    HPI              9/3/2024   General Health   How would you rate your overall physical health? Good   Feel stress (tense, anxious, or unable to sleep) Very much      (!) STRESS CONCERN      9/3/2024   Nutrition   Three or more servings of  calcium each day? Yes   Diet: Regular (no restrictions)   How many servings of fruit and vegetables per day? (!) 2-3   How many sweetened beverages each day? 0-1            9/3/2024   Exercise   Days per week of moderate/strenous exercise 3 days   Average minutes spent exercising at this level 30 min            9/3/2024   Social Factors   Frequency of gathering with friends or relatives Three times a week   Worry food won't last until get money to buy more No   Food not last or not have enough money for food? No   Do you have housing? (Housing is defined as stable permanent housing and does not include staying ouside in a car, in a tent, in an abandoned building, in an overnight shelter, or couch-surfing.) No   Are you worried about losing your housing? No   Lack of transportation? No   Unable to get utilities (heat,electricity)? No   Want help with housing or utility concern? No      (!) HOUSING CONCERN PRESENT      9/3/2024   Dental   Dentist two times every year? Yes            9/3/2024   TB Screening   Were you born outside of the [place]? Yes                  9/3/2024   Substance Use   Alcohol more than 3/day or more than 7/wk No   Do you use any other substances recreationally? No        Social History     Tobacco Use    Smoking status: Never     Passive exposure: Never    Smokeless tobacco: Never   Vaping Use    Vaping status: Never Used   Substance Use Topics    Alcohol use: No    Drug use: No           7/1/2024   LAST FHS-7 RESULTS   1st degree relative breast or ovarian cancer No                     9/3/2024   One time HIV Screening   Previous HIV test? No          9/3/2024   STI Screening   New sexual partner(s) since last STI/HIV test? No        History of abnormal Pap smear:         Latest Ref Rng & Units 6/27/2023     7:46 AM 10/5/2018     9:20 AM 10/5/2018     9:00 AM   PAP / HPV   PAP  Negative for Intraepithelial Lesion or Malignancy (NILM)      PAP (Historical)    NIL    HPV 16 DNA Negative Negative   Negative     HPV 18 DNA Negative Negative  Negative     Other HR HPV Negative Negative  Negative       ASCVD Risk   The 10-year ASCVD risk score (Brianna RODRIGUEZ, et al., 2019) is: 0.2%    Values used to calculate the score:      Age: 49 years      Sex: Female      Is Non- : No      Diabetic: No      Tobacco smoker: No      Systolic Blood Pressure: 90 mmHg      Is BP treated: No      HDL Cholesterol: 86 mg/dL      Total Cholesterol: 177 mg/dL       Reviewed and updated as needed this visit by Provider                             Objective    Exam  BP 90/58   Pulse 56   Temp 98  F (36.7  C) (Oral)   Resp 20   Ht 1.524 m (5')   Wt 44.9 kg (99 lb)   LMP  (LMP Unknown)   SpO2 100%   BMI 19.33 kg/m     Estimated body mass index is 19.33 kg/m  as calculated from the following:    Height as of this encounter: 1.524 m (5').    Weight as of this encounter: 44.9 kg (99 lb).    Physical Exam          Signed Electronically by: Jacquie Georges MD

## 2024-09-05 LAB
ALBUMIN SERPL BCG-MCNC: 4.5 G/DL (ref 3.5–5.2)
ALP SERPL-CCNC: 82 U/L (ref 40–150)
ALT SERPL W P-5'-P-CCNC: 22 U/L (ref 0–50)
ANION GAP SERPL CALCULATED.3IONS-SCNC: 12 MMOL/L (ref 7–15)
AST SERPL W P-5'-P-CCNC: 21 U/L (ref 0–45)
BILIRUB SERPL-MCNC: 0.5 MG/DL
BUN SERPL-MCNC: 13.6 MG/DL (ref 6–20)
CALCIUM SERPL-MCNC: 9.6 MG/DL (ref 8.8–10.4)
CHLORIDE SERPL-SCNC: 104 MMOL/L (ref 98–107)
CHOLEST SERPL-MCNC: 221 MG/DL
CREAT SERPL-MCNC: 0.57 MG/DL (ref 0.51–0.95)
EGFRCR SERPLBLD CKD-EPI 2021: >90 ML/MIN/1.73M2
FASTING STATUS PATIENT QL REPORTED: YES
FASTING STATUS PATIENT QL REPORTED: YES
GLUCOSE SERPL-MCNC: 84 MG/DL (ref 70–99)
HCO3 SERPL-SCNC: 24 MMOL/L (ref 22–29)
HCV AB SERPL QL IA: NONREACTIVE
HDLC SERPL-MCNC: 95 MG/DL
HIV 1+2 AB+HIV1 P24 AG SERPL QL IA: NONREACTIVE
LDLC SERPL CALC-MCNC: 117 MG/DL
NONHDLC SERPL-MCNC: 126 MG/DL
POTASSIUM SERPL-SCNC: 4 MMOL/L (ref 3.4–5.3)
PROT SERPL-MCNC: 7.4 G/DL (ref 6.4–8.3)
SODIUM SERPL-SCNC: 140 MMOL/L (ref 135–145)
TRIGL SERPL-MCNC: 43 MG/DL
TSH SERPL DL<=0.005 MIU/L-ACNC: 3.44 UIU/ML (ref 0.3–4.2)
VIT D+METAB SERPL-MCNC: 28 NG/ML (ref 20–50)

## 2024-09-26 NOTE — TELEPHONE ENCOUNTER
Patient calling stating that her employer did not receive the fax.  Please resend to number below.  Thanks!    Fax [phone]

## 2024-10-07 ENCOUNTER — OFFICE VISIT (OUTPATIENT)
Dept: INTERNAL MEDICINE | Facility: CLINIC | Age: 49
End: 2024-10-07
Payer: COMMERCIAL

## 2024-10-07 VITALS
OXYGEN SATURATION: 99 % | HEART RATE: 76 BPM | TEMPERATURE: 97.3 F | RESPIRATION RATE: 16 BRPM | BODY MASS INDEX: 20.02 KG/M2 | DIASTOLIC BLOOD PRESSURE: 60 MMHG | SYSTOLIC BLOOD PRESSURE: 94 MMHG | WEIGHT: 102.5 LBS

## 2024-10-07 DIAGNOSIS — Z20.1 EXPOSURE TO TB: Primary | ICD-10-CM

## 2024-10-07 LAB
ANION GAP SERPL CALCULATED.3IONS-SCNC: 8 MMOL/L (ref 7–15)
BASOPHILS # BLD AUTO: 0 10E3/UL (ref 0–0.2)
BASOPHILS NFR BLD AUTO: 1 %
BUN SERPL-MCNC: 8.2 MG/DL (ref 6–20)
CALCIUM SERPL-MCNC: 9.2 MG/DL (ref 8.8–10.4)
CHLORIDE SERPL-SCNC: 106 MMOL/L (ref 98–107)
CREAT SERPL-MCNC: 0.56 MG/DL (ref 0.51–0.95)
EGFRCR SERPLBLD CKD-EPI 2021: >90 ML/MIN/1.73M2
EOSINOPHIL # BLD AUTO: 0.1 10E3/UL (ref 0–0.7)
EOSINOPHIL NFR BLD AUTO: 3 %
ERYTHROCYTE [DISTWIDTH] IN BLOOD BY AUTOMATED COUNT: 13.1 % (ref 10–15)
GLUCOSE SERPL-MCNC: 101 MG/DL (ref 70–99)
HCO3 SERPL-SCNC: 27 MMOL/L (ref 22–29)
HCT VFR BLD AUTO: 38.7 % (ref 35–47)
HGB BLD-MCNC: 12.8 G/DL (ref 11.7–15.7)
HOLD SPECIMEN: NORMAL
HOLD SPECIMEN: NORMAL
IMM GRANULOCYTES # BLD: 0 10E3/UL
IMM GRANULOCYTES NFR BLD: 0 %
LYMPHOCYTES # BLD AUTO: 1.3 10E3/UL (ref 0.8–5.3)
LYMPHOCYTES NFR BLD AUTO: 40 %
MCH RBC QN AUTO: 29.6 PG (ref 26.5–33)
MCHC RBC AUTO-ENTMCNC: 33.1 G/DL (ref 31.5–36.5)
MCV RBC AUTO: 89 FL (ref 78–100)
MONOCYTES # BLD AUTO: 0.3 10E3/UL (ref 0–1.3)
MONOCYTES NFR BLD AUTO: 10 %
NEUTROPHILS # BLD AUTO: 1.6 10E3/UL (ref 1.6–8.3)
NEUTROPHILS NFR BLD AUTO: 47 %
PLATELET # BLD AUTO: 321 10E3/UL (ref 150–450)
POTASSIUM SERPL-SCNC: 3.7 MMOL/L (ref 3.4–5.3)
RBC # BLD AUTO: 4.33 10E6/UL (ref 3.8–5.2)
SODIUM SERPL-SCNC: 141 MMOL/L (ref 135–145)
WBC # BLD AUTO: 3.3 10E3/UL (ref 4–11)

## 2024-10-07 PROCEDURE — 86481 TB AG RESPONSE T-CELL SUSP: CPT

## 2024-10-07 PROCEDURE — 99213 OFFICE O/P EST LOW 20 MIN: CPT

## 2024-10-07 PROCEDURE — 85025 COMPLETE CBC W/AUTO DIFF WBC: CPT

## 2024-10-07 PROCEDURE — 80048 BASIC METABOLIC PNL TOTAL CA: CPT

## 2024-10-07 PROCEDURE — 36415 COLL VENOUS BLD VENIPUNCTURE: CPT

## 2024-10-07 ASSESSMENT — PAIN SCALES - GENERAL: PAINLEVEL: NO PAIN (0)

## 2024-10-07 NOTE — PROGRESS NOTES
"  Assessment & Plan     (Z20.1) Exposure to TB  (primary encounter diagnosis)  Comment: Pt's uncle came to visit him from [place] 2 weeks ago and she started to develop \"cold symptoms\" shortly after his visit.  Pt reports that she found out that her uncle had TB 7 days ago.  Plan: Quantiferon TB Gold Plus, Extra Purple Top EDTA        (LAB USE ONLY), Extra Green Top Tube (LAB USE         ONLY)        Results pending                Subjective   Lubna is a 49 year old, presenting for the following health issues: Pt's uncle came to visit him from [place] 2 weeks ago and she started to develop \"cold symptoms\" shortly after his visit.  Pt reports that she found out that her uncle had TB 7 days ago. Pt had a productive cough of clear secretions, runny nose, hoarse throat. Pt reported SOB when needing to cough. No report of fevers night sweats or chills.  Pt stated that her symptoms subsided after 5 days and 2 days later she started feeling symptoms again possibly our of fear. Pt denies any symptoms presently except for a cough.    Explained to pt about the TB test and to let her know that if it is indeterminate that a chest X-ray may be needed and if it is positive that she will need to be treated with medications.  TB contact        10/7/2024     2:35 PM   Additional Questions   Roomed by hope r   Accompanied by self         10/7/2024     2:35 PM   Patient Reported Additional Medications   Patient reports taking the following new medications no     History of Present Illness       Reason for visit:  Check up contact someone with TB   She is taking medications regularly.                 Review of Systems  Constitutional, HEENT, cardiovascular, pulmonary, gi and gu systems are negative, except as otherwise noted.      Objective    BP 94/60 (BP Location: Left arm, Patient Position: Sitting, Cuff Size: Adult Regular)   Pulse 76   Temp 97.3  F (36.3  C) (Tympanic)   Resp 16   Wt 46.5 kg (102 lb 8 oz)   LMP  (LMP Unknown)   " SpO2 99%   Breastfeeding No   BMI 20.02 kg/m    Body mass index is 20.02 kg/m .  Physical Exam   GENERAL: alert and no distress  NECK: bilateral anterior cervical adenopathy, no asymmetry, masses, or scars, and thyroid normal to palpation  RESP: lungs clear to auscultation - no rales, rhonchi or wheezes  CV: regular rate and rhythm, normal S1 S2, no S3 or S4, no murmur, click or rub, no peripheral edema  ABDOMEN: soft, nontender, no hepatosplenomegaly, no masses and bowel sounds normal  MS: no gross musculoskeletal defects noted, no edema  SKIN: no suspicious lesions or rashes  NEURO: Normal strength and tone, mentation intact and speech normal  PSYCH: mentation appears normal, affect normal/bright  LYMPH: anterior cervical: enlarged tender nodes, one on each side  posterior cervical: no adenopathy  supraclavicular: no adenopathy  axillary: no adenopathy  inguinal: no adenopathy            Signed Electronically by: FABIANA Baldwin CNP

## 2024-10-07 NOTE — PATIENT INSTRUCTIONS
I will let you know the results of your TB test and if it is not clear you may need a chest X-ray to rule out TB. If it is positive then medications will be needed to treat you effectively.

## 2024-10-08 LAB
GAMMA INTERFERON BACKGROUND BLD IA-ACNC: 0.03 IU/ML
M TB IFN-G BLD-IMP: NEGATIVE
M TB IFN-G CD4+ BCKGRND COR BLD-ACNC: 9.97 IU/ML
MITOGEN IGNF BCKGRD COR BLD-ACNC: 0.02 IU/ML
MITOGEN IGNF BCKGRD COR BLD-ACNC: 0.02 IU/ML
QUANTIFERON MITOGEN: 10 IU/ML
QUANTIFERON NIL TUBE: 0.03 IU/ML
QUANTIFERON TB1 TUBE: 0.05 IU/ML
QUANTIFERON TB2 TUBE: 0.05

## 2024-10-11 ENCOUNTER — OFFICE VISIT (OUTPATIENT)
Dept: SURGERY | Facility: CLINIC | Age: 49
End: 2024-10-11
Payer: COMMERCIAL

## 2024-10-11 VITALS
SYSTOLIC BLOOD PRESSURE: 100 MMHG | OXYGEN SATURATION: 99 % | RESPIRATION RATE: 16 BRPM | HEIGHT: 61 IN | HEART RATE: 62 BPM | WEIGHT: 102 LBS | DIASTOLIC BLOOD PRESSURE: 62 MMHG | BODY MASS INDEX: 19.26 KG/M2

## 2024-10-11 DIAGNOSIS — K42.9 UMBILICAL HERNIA WITHOUT OBSTRUCTION AND WITHOUT GANGRENE: ICD-10-CM

## 2024-10-11 PROCEDURE — 99204 OFFICE O/P NEW MOD 45 MIN: CPT | Performed by: SURGERY

## 2024-10-11 NOTE — PATIENT INSTRUCTIONS
CT ABDOMEN AND PELVIS WITH CONTRAST    Date: 10/19/2024  Time: 8:00 AM   Location: [facility]  [address] [address]  [address]        Please check in at 7:30 AM       Preparation for CT scanning    Please bring an updated list of all your medications, including IV Chemo Therapy over the counter and herbal supplements.    For questions regarding your test please call [phone].   If you are taking any medications and you have questions, please call your primary care physician.

## 2024-10-11 NOTE — CONFIDENTIAL NOTE
"[facility]  New Patient Office Visit    Assessment:   Lubna Corcoran is a 49 year old female with primary, reducible supraumbilical hernia.  The actually fascial defect feels to be about 1.5 cm.  She also has a very wide rectus diastasis of the lower abdomen, measuring up to 10 cm wide on exam. This complicates her hernia repair and I have recommended that we start with an abdominal CT to better delineate the anatomy surgical planning    Plan:    - CT abdomen/pelvis ordered  - Return to clinic after CT to discuss repair options    We have had a detailed discussion regarding the nature of ventral hernias, and that watchful waiting for small asymptomatic hernias is acceptable, but that in general they do tend to enlarge or become symptomatic over time. The patient has been experiencing an increase in the size of the hernia and is considering repair.  Surgery, indications, alternatives, risks, benefits, incisions, scarring, anesthesia, recovery, mesh, infection, bleeding, numbness, hernia recurrence, lifting and activity limitations after surgery.  All questions have been answered to the best of my ability.    HPI:  Lubna Corcoran is a 49 year old female who presents for evaluation of a bulge in the supraumbilical region.   She has no history of surgery in this area, but did have a pregnancy about 20 years ago which resulted in a very wide rectus diastasis of the lower abdominal wall.   At the superior aspect of the diastasis, she now has a bulge which \"pops out\" when she is lifting or straining her abdominal muscles.     She does not have pain but has noticed that the bulge has been getting bigger over the past few years.     Prior incarceration:  No   Nausea/vomitting/bloating:  No   Bulge/mass:  Yes    Previous herniorrhaphy:  No     Constipation: No  Cough: No  Diabetes: No  Current smoker: No    Heavy lifting > 20 lb: No    Past Medical History:  Past Medical History:   Diagnosis Date    CARDIOVASCULAR " "SCREENING; LDL GOAL LESS THAN 160     Chronic lower back pain     Leukopenia 2012    NO ACTIVE PROBLEMS     Vertigo 2013    Vitamin D deficiencies        Current Outpatient Medications   Medication Sig Dispense Refill    Cholecalciferol (VITAMIN D) 1000 UNITS capsule Take 1 capsule by mouth daily      cyanocobalamin (VITAMIN B-12) 1000 MCG tablet Take 1,000 mcg by mouth daily      Multiple Vitamin (MULTIVITAMIN ADULT PO) Take by mouth.       No current facility-administered medications for this visit.        Past Surgical History:  Past Surgical History:   Procedure Laterality Date    BREAST BIOPSY, RT/LT      BREAST SURGERY  Lump removed        SECTION          Social History:  Social History     Tobacco Use    Smoking status: Never     Passive exposure: Never    Smokeless tobacco: Never   Vaping Use    Vaping status: Never Used   Substance Use Topics    Alcohol use: No    Drug use: No        Family History:  Family History   Problem Relation Age of Onset    No Known Problems Mother     Diabetes Father     Heart Disease Father     Breast Cancer No family hx of     Ovarian Cancer No family hx of      No Family history of bleeding or clotting disorders or reactions to anesthesia    ROS:  The 10 point review of systems is negative other than noted in the HPI and above.    PE:    Vitals - /62   Pulse 62   Resp 16   Ht 1.537 m (5' 0.5\")   Wt 46.3 kg (102 lb)   LMP  (LMP Unknown)   SpO2 99%   BMI 19.59 kg/m    BMI - Body mass index is 19.59 kg/m .  General - Well-developed, well-nourished, patient able to get up on table without difficulty.  HEENT - Mucous membranes moist.  Sclera are nonicteric.  Lymph -  No inguinal lymphadenopathy or masses   Respiratory - regular and non labored  CV - regular pulse  Abdomen - abdomen is soft without significant tenderness, masses, organomegaly or guarding,   Hernia - Upon standing there is an obvious bulge in the supraumbilical region which is " present spontaneously. The hernia is manually reducible. There is no overlying skin change. The defect feels to be ~1.5  cm. Surrounding this hernia, this is a very wide area of rectus diastasis, measuring up to 10 cm wide in the lower abdominal midline.  I can only definitively feel the one fascial defect.   Extremities - without edema  Psych - mood and affect are appropriate  Neurologic - alert, speech is clear, moves all extremities with good strength  Integument - without lesions, rashes, or jaundice    This note may have been created using voice recognition software. Undetected word substitutions or other errors may have occurred.     45 minutes total time spent on the date of this encounter doing: chart review, review of test results, patient visit, physical exam, education, counseling, developing plan of care, and documenting.    Kathy Duke MD  10/11/24 9:20 AM     Please route or send letter to:  Primary Care Provider (PCP) and Referring Provider

## 2024-10-19 ENCOUNTER — HOSPITAL ENCOUNTER (OUTPATIENT)
Dept: CT IMAGING | Facility: CLINIC | Age: 49
Discharge: HOME | End: 2024-10-19
Attending: SURGERY | Admitting: SURGERY
Payer: COMMERCIAL

## 2024-10-19 DIAGNOSIS — K42.9 UMBILICAL HERNIA WITHOUT OBSTRUCTION AND WITHOUT GANGRENE: ICD-10-CM

## 2024-10-19 PROCEDURE — 250N000011 HC RX IP 250 OP 636: Performed by: SURGERY

## 2024-10-19 PROCEDURE — 74177 CT ABD & PELVIS W/CONTRAST: CPT

## 2024-10-19 RX ORDER — IOPAMIDOL 755 MG/ML
52 INJECTION, SOLUTION INTRAVASCULAR ONCE
Status: COMPLETED | OUTPATIENT
Start: 2024-10-19 | End: 2024-10-19

## 2024-10-19 RX ADMIN — IOPAMIDOL 52 ML: 755 INJECTION, SOLUTION INTRAVENOUS at 07:50

## 2024-12-03 ENCOUNTER — NURSE TRIAGE (OUTPATIENT)
Dept: OPHTHALMOLOGY | Facility: CLINIC | Age: 49
End: 2024-12-03
Payer: COMMERCIAL

## 2024-12-03 NOTE — TELEPHONE ENCOUNTER
Called patient to discuss appointment. Have her scheduled at 12 in [place] tomorrow. Told her to call with questions.

## 2024-12-03 NOTE — TELEPHONE ENCOUNTER
Spoke to pt at 1204    Bilateral eye symptoms times 2 weeks-- wax/wane    Pt can have eye pain, redness.    Bilateral blurrier vision times 2 weeks--wax and wane.    H/o blepharitis per care everywhere.    Pt not using artificial tears    Pt states using warm compresses.    [facility]    Pt would prefer scheduling closer to home in [place] ([place] location).    Reviewed would forward to community team to assist in scheduling appt more locally in next couple weeks.    Pt seemed comfortable with information/plan.    Severino Irene RN 12:10 PM 12/03/24

## 2024-12-03 NOTE — TELEPHONE ENCOUNTER
"Nurse Triage SBAR    Is this a 2nd Level Triage? YES, LICENSED PRACTITIONER REVIEW IS REQUIRED    Situation:    Eye pain 6/10, redness. Bilateral R> L with blurred vision. No flashers/ floaters or dark areas in vision. No diplopia.  Pain seems to wax and wane. Noted over past few weeks and today feels most painful. No eye drops, no glasses, no contacts, no eye exams previously  Background:  Not diabetic, no hx of glaucoma in parents that she is aware of.    Assessment: Eye pain 6/10 R> L but present bilaterally with redness, blurred vision that comes and goes. Pain worsening today. Red sclera and no significant drainage. No fever/ headache/ URI/ diabetes    Protocol Recommended Disposition:   ED/ Office.  Patient has transport, [facility] area is fine. Lives in [place]    Recommendation: Eye appt today/. Tomorrow, UC if unavailable.         Reason for Disposition   Blurred vision AND new-onset or getting worse    Additional Information   Negative: Followed an eye injury   Negative: Eye pain from chemical in the eye   Negative: Eye pain from foreign body in eye   Negative: Has sinus pain or pressure   Negative: Vomiting   Negative: Ulcer or sore seen on the cornea (clear center part of the eye)   Negative: Patient sounds very sick or weak to the triager   Negative: Eyelids are very swollen (shut or almost) and fever   Negative: Eyelid (outer) is very red and fever   Negative: SEVERE eye pain   Negative: Complete loss of vision in one or both eyes    Answer Assessment - Initial Assessment Questions  1. ONSET: \"When did the pain start?\" (e.g., minutes, hours, days)     Eye pain, bilaterally R>L  2. TIMING: \"Does the pain come and go, or has it been constant since it started?\" (e.g., constant, intermittent, fleeting)      Pain and redness, it comes and goes  Hurting more today  3. SEVERITY:  6/10  4. LOCATION: \"Where does it hurt?\"  (e.g., eyelid, eye, cheekbone)      Eye itself  5. CAUSE: \"What do you think is " "causing the pain?\"      Unknown- no trauma, foreign body  6. VISION: \"Do you have blurred vision or changes in your vision?\"    Blurry vision, comes and goes  7. EYE DISCHARGE: white discharge     8. FEVER: No  9. OTHER SYMPTOMS: \"Do you have any other symptoms?\" (e.g., headache, nasal discharge, facial rash)   None  10. PREGNANCY: \"Is there any chance you are pregnant?\" \"When was your last menstrual period?\"        No    Protocols used: Eye Pain and Other Symptoms-A-[place]    "

## 2024-12-03 NOTE — TELEPHONE ENCOUNTER
Caller reporting the following red-flag symptom(s): eye pain, redness     Per the system red-flag symptom policy, patient was instructed to:  speak with a Registered Nurse    Action:  Patient warm transferred to a Registered Nurse

## 2025-01-30 ENCOUNTER — NURSE TRIAGE (OUTPATIENT)
Dept: INTERNAL MEDICINE | Facility: CLINIC | Age: 50
End: 2025-01-30

## 2025-01-30 NOTE — TELEPHONE ENCOUNTER
"7 days  Improvement     Reason for Disposition   Patient wants to be seen    Additional Information   Negative: SEVERE difficulty breathing (e.g., struggling for each breath, speaks in single words)   Negative: Very weak (can't stand)   Negative: Sounds like a life-threatening emergency to the triager   Negative: Symptoms of COVID-19 (e.g., cough, fever, SOB, or others) and COVID-19 is widespread in the community   Negative: Symptoms of COVID-19 (e.g., cough, fever, SOB, or others) and within 14 days of COVID-19 EXPOSURE   Negative: Symptoms of FLU (e.g., cough, runny nose, SOB, sore throat; with or without fever) and within 14 days of EXPOSURE (close contact) with someone diagnosed with influenza (e.g., flu test positive)   Negative: Difficulty breathing and not from stuffy nose (e.g., not relieved by cleaning out the nose)   Negative: Runny nose is caused by pollen or other allergies   Negative: Cough is main symptom   Negative: Sore throat is main symptom   Negative: Patient sounds very sick or weak to the triager   Negative: Fever > 103 F (39.4 C)   Negative: Fever > 101 F (38.3 C) and over 60 years of age   Negative: Fever > 100 F (37.8 C) and has diabetes mellitus or a weak immune system (e.g., HIV positive, cancer chemotherapy, organ transplant, splenectomy, chronic steroids)   Negative: Fever > 100 F (37.8 C) and bedridden (e.g., CVA, chronic illness, recovering from surgery)   Negative: Fever present > 3 days (72 hours)   Negative: Fever returns after gone for over 24 hours and symptoms worse or not improved   Negative: Sinus pain (not just congestion) and fever   Negative: Earache   Negative: Nasal discharge present > 10 days   Negative: Using nasal washes and pain medicine > 24 hours and sinus pain (lower forehead, cheekbone, or eye) persists   Negative: Sinus congestion (pressure, fullness) present > 10 days    Answer Assessment - Initial Assessment Questions  1. ONSET: \"When did the nasal discharge " "start?\"       7 days ago  2. AMOUNT: \"How much discharge is there?\"       Moderate amount, but symptoms are improving since onset  3. COUGH: \"Do you have a cough?\" If Yes, ask: \"Describe the color of your mucus.\" (e.g., clear, white, yellow, green)      Yes. Patient is not coughing anything up though.  4. RESPIRATORY DISTRESS: \"Describe your breathing.\"       Patient denies.  5. FEVER: \"Do you have a fever?\" If Yes, ask: \"What is your temperature, how was it measured, and when did it start?\"      Patient denies.  6. SEVERITY: \"Overall, how bad are you feeling right now?\" (e.g., doesn't interfere with normal activities, staying home from school/work, staying in bed)       Does interfere with normal activities   7. OTHER SYMPTOMS: \"Do you have any other symptoms?\" (e.g., earache, mouth sores, sore throat, wheezing)      Chills, runny nose, cough  8. PREGNANCY: \"Is there any chance you are pregnant?\" \"When was your last menstrual period?\"      No    Protocols used: Common Cold-A-[place]    "

## 2025-06-20 ENCOUNTER — ANCILLARY PROCEDURE (OUTPATIENT)
Dept: GENERAL RADIOLOGY | Facility: CLINIC | Age: 50
End: 2025-06-20
Attending: FAMILY MEDICINE
Payer: COMMERCIAL

## 2025-06-20 DIAGNOSIS — M25.569 KNEE PAIN: ICD-10-CM

## 2025-06-20 PROCEDURE — 73562 X-RAY EXAM OF KNEE 3: CPT | Mod: TC | Performed by: FAMILY MEDICINE
